# Patient Record
Sex: FEMALE | Race: WHITE | Employment: UNEMPLOYED | URBAN - METROPOLITAN AREA
[De-identification: names, ages, dates, MRNs, and addresses within clinical notes are randomized per-mention and may not be internally consistent; named-entity substitution may affect disease eponyms.]

---

## 2017-05-12 ENCOUNTER — OFFICE VISIT (OUTPATIENT)
Dept: GASTROENTEROLOGY | Age: 57
End: 2017-05-12
Payer: MEDICARE

## 2017-05-12 VITALS
TEMPERATURE: 98.1 F | WEIGHT: 131.4 LBS | RESPIRATION RATE: 14 BRPM | OXYGEN SATURATION: 98 % | DIASTOLIC BLOOD PRESSURE: 79 MMHG | HEIGHT: 58 IN | SYSTOLIC BLOOD PRESSURE: 115 MMHG | HEART RATE: 62 BPM | BODY MASS INDEX: 27.58 KG/M2

## 2017-05-12 DIAGNOSIS — R74.8 ELEVATED LIVER ENZYMES: ICD-10-CM

## 2017-05-12 DIAGNOSIS — D13.1 BENIGN FUNDIC GLAND POLYPS OF STOMACH: Primary | ICD-10-CM

## 2017-05-12 DIAGNOSIS — K21.9 GASTROESOPHAGEAL REFLUX DISEASE WITHOUT ESOPHAGITIS: ICD-10-CM

## 2017-05-12 PROCEDURE — 99214 OFFICE O/P EST MOD 30 MIN: CPT | Performed by: INTERNAL MEDICINE

## 2017-05-12 ASSESSMENT — ENCOUNTER SYMPTOMS
NAUSEA: 0
RECTAL PAIN: 0
DIARRHEA: 0
BLOOD IN STOOL: 0
RESPIRATORY NEGATIVE: 1
CONSTIPATION: 0
ABDOMINAL PAIN: 0
VOMITING: 0
ABDOMINAL DISTENTION: 0
ANAL BLEEDING: 0
EYES NEGATIVE: 1
ALLERGIC/IMMUNOLOGIC NEGATIVE: 1

## 2017-09-21 ENCOUNTER — HOSPITAL ENCOUNTER (OUTPATIENT)
Dept: MAMMOGRAPHY | Age: 57
Discharge: HOME | End: 2017-09-21
Payer: MEDICARE

## 2017-09-21 DIAGNOSIS — Z12.39 SCREENING BREAST EXAMINATION: ICD-10-CM

## 2017-09-21 PROCEDURE — G0202 SCR MAMMO BI INCL CAD: HCPCS

## 2018-10-03 ENCOUNTER — HOSPITAL ENCOUNTER (OUTPATIENT)
Dept: MAMMOGRAPHY | Age: 58
Discharge: HOME | End: 2018-10-05
Payer: MEDICARE

## 2018-10-03 DIAGNOSIS — Z12.39 SCREENING BREAST EXAMINATION: ICD-10-CM

## 2018-10-03 PROCEDURE — 77067 SCR MAMMO BI INCL CAD: CPT

## 2019-02-21 ENCOUNTER — APPOINTMENT (OUTPATIENT)
Dept: CT IMAGING | Age: 59
End: 2019-02-21
Payer: MEDICARE

## 2019-02-21 ENCOUNTER — APPOINTMENT (OUTPATIENT)
Dept: GENERAL RADIOLOGY | Age: 59
End: 2019-02-21
Payer: MEDICARE

## 2019-02-21 ENCOUNTER — HOSPITAL ENCOUNTER (EMERGENCY)
Age: 59
Discharge: HOME | End: 2019-02-21
Attending: EMERGENCY MEDICINE
Payer: MEDICARE

## 2019-02-21 VITALS
OXYGEN SATURATION: 96 % | RESPIRATION RATE: 18 BRPM | WEIGHT: 130 LBS | TEMPERATURE: 98.3 F | HEART RATE: 54 BPM | DIASTOLIC BLOOD PRESSURE: 63 MMHG | SYSTOLIC BLOOD PRESSURE: 115 MMHG | BODY MASS INDEX: 27.17 KG/M2
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
## 2019-02-21 DIAGNOSIS — S05.11XA CONTUSION OF RIGHT EYE, INITIAL ENCOUNTER: ICD-10-CM

## 2019-02-21 PROCEDURE — 73564 X-RAY EXAM KNEE 4 OR MORE: CPT

## 2019-02-21 PROCEDURE — 70450 CT HEAD/BRAIN W/O DYE: CPT

## 2019-02-21 PROCEDURE — 99284 EMERGENCY DEPT VISIT MOD MDM: CPT

## 2019-02-21 PROCEDURE — 70486 CT MAXILLOFACIAL W/O DYE: CPT

## 2019-02-21 RX ORDER — HYDROCODONE BITARTRATE AND ACETAMINOPHEN 5; 325 MG/1; MG/1
1 TABLET ORAL EVERY 8 HOURS PRN
Qty: 9 TABLET | Refills: 0 | Status: SHIPPED | OUTPATIENT
Start: 2019-02-21 | End: 2019-02-21

## 2019-02-21 RX ORDER — HYDROCODONE BITARTRATE AND ACETAMINOPHEN 5; 325 MG/1; MG/1
1 TABLET ORAL EVERY 8 HOURS PRN
Qty: 9 TABLET | Refills: 0 | Status: SHIPPED | OUTPATIENT
Start: 2019-02-21 | End: 2019-02-24

## 2019-02-21 ASSESSMENT — PAIN DESCRIPTION - PAIN TYPE: TYPE: ACUTE PAIN

## 2019-02-21 ASSESSMENT — PAIN DESCRIPTION - LOCATION: LOCATION: HEAD

## 2019-02-21 ASSESSMENT — PAIN SCALES - GENERAL: PAINLEVEL_OUTOF10: 7

## 2019-06-14 ENCOUNTER — HOSPITAL ENCOUNTER (OUTPATIENT)
Dept: CT IMAGING | Age: 59
Discharge: HOME | End: 2019-06-16
Payer: MEDICARE

## 2019-06-14 DIAGNOSIS — R10.32 ABDOMINAL PAIN, LEFT LOWER QUADRANT: ICD-10-CM

## 2019-06-14 LAB
CREAT SERPL-MCNC: 0.88 MG/DL (ref 0.5–0.9)
GFR AFRICAN AMERICAN: >60 ML/MIN
GFR NON-AFRICAN AMERICAN: >60 ML/MIN
GFR SERPL CREATININE-BSD FRML MDRD: NORMAL ML/MIN/{1.73_M2}
GFR SERPL CREATININE-BSD FRML MDRD: NORMAL ML/MIN/{1.73_M2}

## 2019-06-14 PROCEDURE — 74177 CT ABD & PELVIS W/CONTRAST: CPT

## 2019-06-14 PROCEDURE — 82565 ASSAY OF CREATININE: CPT

## 2019-06-14 PROCEDURE — 6360000004 HC RX CONTRAST MEDICATION: Performed by: FAMILY MEDICINE

## 2019-06-14 PROCEDURE — 36415 COLL VENOUS BLD VENIPUNCTURE: CPT

## 2019-06-14 PROCEDURE — 2580000003 HC RX 258: Performed by: FAMILY MEDICINE

## 2019-06-14 RX ORDER — SODIUM CHLORIDE 0.9 % (FLUSH) 0.9 %
10 SYRINGE (ML) INJECTION PRN
Status: DISCONTINUED | OUTPATIENT
Start: 2019-06-14 | End: 2019-06-17 | Stop reason: HOSPADM

## 2019-06-14 RX ORDER — 0.9 % SODIUM CHLORIDE 0.9 %
80 INTRAVENOUS SOLUTION INTRAVENOUS ONCE
Status: COMPLETED | OUTPATIENT
Start: 2019-06-14 | End: 2019-06-14

## 2019-06-14 RX ADMIN — IOHEXOL 30 ML: 300 INJECTION, SOLUTION INTRAVENOUS at 10:13

## 2019-06-14 RX ADMIN — SODIUM CHLORIDE 80 ML: 0.9 INJECTION, SOLUTION INTRAVENOUS at 10:13

## 2019-06-14 RX ADMIN — SODIUM CHLORIDE, PRESERVATIVE FREE 10 ML: 5 INJECTION INTRAVENOUS at 10:14

## 2019-06-14 RX ADMIN — IOPAMIDOL 80 ML: 755 INJECTION, SOLUTION INTRAVENOUS at 10:13

## 2019-06-28 ENCOUNTER — HOSPITAL ENCOUNTER (OUTPATIENT)
Dept: ULTRASOUND IMAGING | Age: 59
Discharge: HOME | End: 2019-06-30
Payer: MEDICARE

## 2019-06-28 ENCOUNTER — HOSPITAL ENCOUNTER (OUTPATIENT)
Dept: CT IMAGING | Age: 59
Discharge: HOME | End: 2019-06-30
Payer: MEDICARE

## 2019-06-28 DIAGNOSIS — R93.5 ABNORMAL CT SCAN, PELVIS: ICD-10-CM

## 2019-06-28 DIAGNOSIS — R91.8 OTHER NONSPECIFIC ABNORMAL FINDING OF LUNG FIELD: ICD-10-CM

## 2019-06-28 PROCEDURE — 76770 US EXAM ABDO BACK WALL COMP: CPT

## 2019-06-28 PROCEDURE — 71250 CT THORAX DX C-: CPT

## 2019-07-03 ENCOUNTER — APPOINTMENT (OUTPATIENT)
Dept: CT IMAGING | Age: 59
End: 2019-07-03
Payer: MEDICARE

## 2019-07-03 ENCOUNTER — HOSPITAL ENCOUNTER (EMERGENCY)
Age: 59
Discharge: HOME | End: 2019-07-03
Attending: EMERGENCY MEDICINE
Payer: MEDICARE

## 2019-07-03 VITALS
SYSTOLIC BLOOD PRESSURE: 112 MMHG | OXYGEN SATURATION: 98 % | HEART RATE: 59 BPM | HEIGHT: 58 IN | BODY MASS INDEX: 28.55 KG/M2 | WEIGHT: 136 LBS | RESPIRATION RATE: 16 BRPM | DIASTOLIC BLOOD PRESSURE: 68 MMHG | TEMPERATURE: 97.8 F

## 2019-07-03 DIAGNOSIS — R10.32 LEFT LOWER QUADRANT PAIN: Primary | ICD-10-CM

## 2019-07-03 LAB
-: ABNORMAL
ABSOLUTE EOS #: 0.08 K/UL (ref 0–0.44)
ABSOLUTE IMMATURE GRANULOCYTE: 0.03 K/UL (ref 0–0.3)
ABSOLUTE LYMPH #: 1.95 K/UL (ref 1.1–3.7)
ABSOLUTE MONO #: 0.47 K/UL (ref 0.1–1.2)
ALBUMIN SERPL-MCNC: 3.7 G/DL (ref 3.5–5.2)
ALBUMIN/GLOBULIN RATIO: NORMAL (ref 1–2.5)
ALP BLD-CCNC: 55 U/L (ref 35–104)
ALT SERPL-CCNC: 13 U/L (ref 5–33)
AMORPHOUS: ABNORMAL
AMYLASE: 72 U/L (ref 28–100)
ANION GAP SERPL CALCULATED.3IONS-SCNC: 10 MMOL/L (ref 9–17)
AST SERPL-CCNC: 21 U/L
BACTERIA: ABNORMAL
BASOPHILS # BLD: 1 % (ref 0–2)
BASOPHILS ABSOLUTE: 0.05 K/UL (ref 0–0.2)
BILIRUB SERPL-MCNC: 0.54 MG/DL (ref 0.3–1.2)
BILIRUBIN DIRECT: 0.1 MG/DL
BILIRUBIN URINE: NEGATIVE
BILIRUBIN, INDIRECT: 0.44 MG/DL (ref 0–1)
BUN BLDV-MCNC: 11 MG/DL (ref 8–23)
BUN/CREAT BLD: 12 (ref 9–20)
CALCIUM SERPL-MCNC: 9 MG/DL (ref 8.6–10.4)
CASTS UA: ABNORMAL /LPF
CHLORIDE BLD-SCNC: 106 MMOL/L (ref 98–107)
CO2: 23 MMOL/L (ref 20–31)
COLOR: YELLOW
COMMENT UA: ABNORMAL
CREAT SERPL-MCNC: 0.9 MG/DL (ref 0.5–0.9)
CRYSTALS, UA: ABNORMAL /HPF
DIFFERENTIAL TYPE: ABNORMAL
EOSINOPHILS RELATIVE PERCENT: 2 % (ref 1–4)
EPITHELIAL CELLS UA: ABNORMAL /HPF (ref 0–5)
GFR AFRICAN AMERICAN: >60 ML/MIN
GFR NON-AFRICAN AMERICAN: >60 ML/MIN
GFR SERPL CREATININE-BSD FRML MDRD: NORMAL ML/MIN/{1.73_M2}
GFR SERPL CREATININE-BSD FRML MDRD: NORMAL ML/MIN/{1.73_M2}
GLOBULIN: NORMAL G/DL (ref 1.5–3.8)
GLUCOSE BLD-MCNC: 88 MG/DL (ref 70–99)
GLUCOSE URINE: NEGATIVE
HCT VFR BLD CALC: 39.3 % (ref 36.3–47.1)
HEMOGLOBIN: 12.4 G/DL (ref 11.9–15.1)
IMMATURE GRANULOCYTES: 1 %
KETONES, URINE: NEGATIVE
LEUKOCYTE ESTERASE, URINE: ABNORMAL
LIPASE: 50 U/L (ref 13–60)
LYMPHOCYTES # BLD: 36 % (ref 24–43)
MCH RBC QN AUTO: 27.3 PG (ref 25.2–33.5)
MCHC RBC AUTO-ENTMCNC: 31.6 G/DL (ref 28.4–34.8)
MCV RBC AUTO: 86.6 FL (ref 82.6–102.9)
MONOCYTES # BLD: 9 % (ref 3–12)
MUCUS: ABNORMAL
NITRITE, URINE: NEGATIVE
NRBC AUTOMATED: 0 PER 100 WBC
OTHER OBSERVATIONS UA: ABNORMAL
PDW BLD-RTO: 13 % (ref 11.8–14.4)
PH UA: 6 (ref 5–8)
PLATELET # BLD: 153 K/UL (ref 138–453)
PLATELET ESTIMATE: ABNORMAL
PMV BLD AUTO: 11.1 FL (ref 8.1–13.5)
POTASSIUM SERPL-SCNC: 4.1 MMOL/L (ref 3.7–5.3)
PROTEIN UA: NEGATIVE
RBC # BLD: 4.54 M/UL (ref 3.95–5.11)
RBC # BLD: ABNORMAL 10*6/UL
RBC UA: ABNORMAL /HPF (ref 0–2)
RENAL EPITHELIAL, UA: ABNORMAL /HPF
SEG NEUTROPHILS: 51 % (ref 36–65)
SEGMENTED NEUTROPHILS ABSOLUTE COUNT: 2.91 K/UL (ref 1.5–8.1)
SODIUM BLD-SCNC: 139 MMOL/L (ref 135–144)
SPECIFIC GRAVITY UA: 1 (ref 1–1.03)
TOTAL PROTEIN: 6.5 G/DL (ref 6.4–8.3)
TRICHOMONAS: ABNORMAL
TURBIDITY: ABNORMAL
URINE HGB: NEGATIVE
UROBILINOGEN, URINE: NORMAL
WBC # BLD: 5.5 K/UL (ref 3.5–11.3)
WBC # BLD: ABNORMAL 10*3/UL
WBC UA: ABNORMAL /HPF (ref 0–5)
YEAST: ABNORMAL

## 2019-07-03 PROCEDURE — 2580000003 HC RX 258: Performed by: EMERGENCY MEDICINE

## 2019-07-03 PROCEDURE — 83690 ASSAY OF LIPASE: CPT

## 2019-07-03 PROCEDURE — 80048 BASIC METABOLIC PNL TOTAL CA: CPT

## 2019-07-03 PROCEDURE — 74177 CT ABD & PELVIS W/CONTRAST: CPT

## 2019-07-03 PROCEDURE — 80076 HEPATIC FUNCTION PANEL: CPT

## 2019-07-03 PROCEDURE — 87086 URINE CULTURE/COLONY COUNT: CPT

## 2019-07-03 PROCEDURE — 99284 EMERGENCY DEPT VISIT MOD MDM: CPT

## 2019-07-03 PROCEDURE — 85025 COMPLETE CBC W/AUTO DIFF WBC: CPT

## 2019-07-03 PROCEDURE — 82150 ASSAY OF AMYLASE: CPT

## 2019-07-03 PROCEDURE — 6360000004 HC RX CONTRAST MEDICATION: Performed by: EMERGENCY MEDICINE

## 2019-07-03 PROCEDURE — 81001 URINALYSIS AUTO W/SCOPE: CPT

## 2019-07-03 RX ORDER — SODIUM CHLORIDE 0.9 % (FLUSH) 0.9 %
10 SYRINGE (ML) INJECTION PRN
Status: DISCONTINUED | OUTPATIENT
Start: 2019-07-03 | End: 2019-07-03 | Stop reason: HOSPADM

## 2019-07-03 RX ORDER — 0.9 % SODIUM CHLORIDE 0.9 %
80 INTRAVENOUS SOLUTION INTRAVENOUS ONCE
Status: COMPLETED | OUTPATIENT
Start: 2019-07-03 | End: 2019-07-03

## 2019-07-03 RX ADMIN — IOPAMIDOL 75 ML: 755 INJECTION, SOLUTION INTRAVENOUS at 17:32

## 2019-07-03 RX ADMIN — SODIUM CHLORIDE 80 ML: 9 INJECTION, SOLUTION INTRAVENOUS at 17:32

## 2019-07-03 RX ADMIN — Medication 10 ML: at 17:32

## 2019-07-03 ASSESSMENT — PAIN SCALES - GENERAL: PAINLEVEL_OUTOF10: 6

## 2019-07-03 ASSESSMENT — PAIN DESCRIPTION - LOCATION: LOCATION: ABDOMEN

## 2019-07-03 ASSESSMENT — PAIN DESCRIPTION - DESCRIPTORS: DESCRIPTORS: ACHING

## 2019-07-03 ASSESSMENT — PAIN DESCRIPTION - FREQUENCY: FREQUENCY: INTERMITTENT

## 2019-07-03 ASSESSMENT — PAIN SCALES - WONG BAKER: WONGBAKER_NUMERICALRESPONSE: 6

## 2019-07-04 LAB
CULTURE: NORMAL
Lab: NORMAL
SPECIMEN DESCRIPTION: NORMAL

## 2019-07-04 ASSESSMENT — ENCOUNTER SYMPTOMS
DIARRHEA: 0
NAUSEA: 0
CONSTIPATION: 0
COLOR CHANGE: 0
SINUS PRESSURE: 0
SHORTNESS OF BREATH: 0
RHINORRHEA: 0
VOMITING: 0
SORE THROAT: 0
COUGH: 0
WHEEZING: 0
ABDOMINAL PAIN: 1

## 2019-07-04 NOTE — ED PROVIDER NOTES
Urinary bladder is grossly unremarkable. Uterus appears to have been removed. No adnexal mass. Peritoneum/Retroperitoneum: No free air. No free fluid. No lymphadenopathy. Bones/Soft Tissues: Abdominal wall demonstrates no acute findings. Osseous structures demonstrates mild degenerative changes. No aggressive bony lesions. *No acute abdominal process. *Sigmoid diverticulosis. *Fat seen within the wall of the cecum suggestive of prior colitis. *Atelectasis/ground-glass opacity/scarring involving the basilar segments of the lower lobes. No focal lung consolidation is seen to suggest pneumonia. If complete evaluation is needed, CT chest is recommended. Us Retroperitoneal Complete    Result Date: 6/28/2019  EXAMINATION: RETROPERITONEAL ULTRASOUND OF THE KIDNEYS AND URINARY BLADDER 6/28/2019 COMPARISON: CT of the abdomen and pelvis from 06/14/2019 HISTORY: ORDERING SYSTEM PROVIDED HISTORY: Abnormal CT scan, pelvis Left lower quadrant pain for 3 months FINDINGS: Study is somewhat limited technically due to patient body habitus. Kidneys: The right kidney measures 8.2 x 3.7 x 4.4 cm and the left kidney measures 9.7 x 5.0 x 4.7 cm. Cortical thickness is 8.2 mm on the right and 12.6 mm on the left. Kidneys demonstrate normal cortical echogenicity. No evidence of hydronephrosis or intrarenal stones. Bladder: Unremarkable appearance of the bladder. Pre voiding volume 183.5 mL. Bilateral ureteral jets demonstrated. No post void residual was obtained. Unremarkable ultrasound of the kidneys and urinary bladder. Interpretation per the Radiologist below, if available at the time of this note:    CT ABDOMEN PELVIS W IV CONTRAST   Final Result   No acute finding in the abdomen or pelvis. Specifically, the appendix is   normal.      Dependent right lower lobe airspace disease may represent atelectasis and or   pneumonia.                  LABS:  Labs Reviewed   CBC WITH AUTO DIFFERENTIAL - Abnormal; Notable for the following components:       Result Value    Immature Granulocytes 1 (*)     All other components within normal limits   URINE RT REFLEX TO CULTURE - Abnormal; Notable for the following components:    Turbidity UA SLIGHTLY CLOUDY (*)     Specific Gravity, UA 1.002 (*)     Leukocyte Esterase, Urine SMALL (*)     All other components within normal limits   MICROSCOPIC URINALYSIS - Abnormal; Notable for the following components:    Crystals UA 0 TO 2 URIC ACID (*)     Bacteria, UA FEW (*)     All other components within normal limits   URINE CULTURE CLEAN CATCH   BASIC METABOLIC PANEL   LIPASE   AMYLASE   HEPATIC FUNCTION PANEL       All other labs were within normal range or not returned as of this dictation. EMERGENCY DEPARTMENT COURSE and DIFFERENTIAL DIAGNOSIS/MDM:   Vitals:    Vitals:    07/03/19 1607   BP: 112/68   Pulse: 59   Resp: 16   Temp: 97.8 °F (36.6 °C)   TempSrc: Oral   SpO2: 98%   Weight: 136 lb (61.7 kg)   Height: 4' 10\" (1.473 m)       Medical Decision Making: imaging and labs are unremarkable. Patient is able to be discharged home. Follow-up with her doctor. Medications   0.9 % sodium chloride bolus (0 mLs Intravenous Stopped 7/3/19 1733)   iopamidol (ISOVUE-370) 76 % injection 75 mL (75 mLs Intravenous Given 7/3/19 1732)       FINAL IMPRESSION      1.  Left lower quadrant pain          DISPOSITION/PLAN   DISPOSITION Decision To Discharge 07/03/2019 06:05:10 PM      PATIENT REFERRED TO:   Bozena Sullivan DO  [facility]  [address] [address]  [phone]    Call in 2 days        DISCHARGE MEDICATIONS:     Discharge Medication List as of 7/3/2019  6:06 PM              (Please note that portions of this note were completed with a voice recognition program.  Efforts were made to edit the dictations but occasionally words are mis-transcribed.)    Severino Brunner NP, APRN - CNP  Certified Nurse Practitioner          Verna Kocher, APRN - CNP  07/04/19 9666

## 2019-09-13 ENCOUNTER — HOSPITAL ENCOUNTER (EMERGENCY)
Age: 59
Discharge: HOME | End: 2019-09-13
Attending: EMERGENCY MEDICINE
Payer: MEDICARE

## 2019-09-13 VITALS
HEART RATE: 70 BPM | DIASTOLIC BLOOD PRESSURE: 63 MMHG | WEIGHT: 136 LBS | OXYGEN SATURATION: 100 % | HEIGHT: 58 IN | RESPIRATION RATE: 18 BRPM | BODY MASS INDEX: 28.55 KG/M2 | TEMPERATURE: 97.7 F | SYSTOLIC BLOOD PRESSURE: 112 MMHG

## 2019-09-13 DIAGNOSIS — B02.9 HERPES ZOSTER WITHOUT COMPLICATION: Primary | ICD-10-CM

## 2019-09-13 PROCEDURE — 6370000000 HC RX 637 (ALT 250 FOR IP): Performed by: NURSE PRACTITIONER

## 2019-09-13 PROCEDURE — 99282 EMERGENCY DEPT VISIT SF MDM: CPT

## 2019-09-13 PROCEDURE — 6360000002 HC RX W HCPCS: Performed by: NURSE PRACTITIONER

## 2019-09-13 PROCEDURE — 96372 THER/PROPH/DIAG INJ SC/IM: CPT

## 2019-09-13 RX ORDER — ACYCLOVIR 800 MG/1
800 TABLET ORAL
Qty: 50 TABLET | Refills: 0 | Status: SHIPPED | OUTPATIENT
Start: 2019-09-13 | End: 2019-09-23

## 2019-09-13 RX ORDER — HYDROCODONE BITARTRATE AND ACETAMINOPHEN 5; 325 MG/1; MG/1
1 TABLET ORAL ONCE
Status: COMPLETED | OUTPATIENT
Start: 2019-09-13 | End: 2019-09-13

## 2019-09-13 RX ORDER — HYDROCODONE BITARTRATE AND ACETAMINOPHEN 5; 325 MG/1; MG/1
1 TABLET ORAL EVERY 8 HOURS PRN
Qty: 20 TABLET | Refills: 0 | Status: SHIPPED | OUTPATIENT
Start: 2019-09-13 | End: 2019-09-20

## 2019-09-13 RX ORDER — KETOROLAC TROMETHAMINE 30 MG/ML
30 INJECTION, SOLUTION INTRAMUSCULAR; INTRAVENOUS ONCE
Status: COMPLETED | OUTPATIENT
Start: 2019-09-13 | End: 2019-09-13

## 2019-09-13 RX ADMIN — KETOROLAC TROMETHAMINE 30 MG: 30 INJECTION, SOLUTION INTRAMUSCULAR at 20:47

## 2019-09-13 RX ADMIN — HYDROCODONE BITARTRATE AND ACETAMINOPHEN 1 TABLET: 5; 325 TABLET ORAL at 20:55

## 2019-09-13 ASSESSMENT — PAIN DESCRIPTION - DESCRIPTORS: DESCRIPTORS: CONSTANT;DISCOMFORT;THROBBING

## 2019-09-13 ASSESSMENT — PAIN DESCRIPTION - ORIENTATION: ORIENTATION: LEFT

## 2019-09-13 ASSESSMENT — PAIN SCALES - GENERAL
PAINLEVEL_OUTOF10: 10

## 2019-09-13 ASSESSMENT — ENCOUNTER SYMPTOMS
BACK PAIN: 0
SHORTNESS OF BREATH: 0

## 2019-09-13 ASSESSMENT — PAIN DESCRIPTION - FREQUENCY: FREQUENCY: CONTINUOUS

## 2019-09-13 ASSESSMENT — PAIN DESCRIPTION - PAIN TYPE: TYPE: ACUTE PAIN

## 2019-09-13 ASSESSMENT — PAIN DESCRIPTION - LOCATION: LOCATION: SHOULDER;NECK

## 2019-09-14 NOTE — ED PROVIDER NOTES
TRIGGER RELEASE      thumb    HYSTERECTOMY      KNEE SURGERY      right     UPPER GASTROINTESTINAL ENDOSCOPY  10/08/2012    [facility]; GERD    WRIST SURGERY      left          FAMILY HISTORY           Problem Relation Age of Onset    Cancer Mother     Cancer Father     Cancer Maternal Grandmother     Cancer Paternal Grandmother      Family Status   Relation Name Status    Mother  Alive    Father  Alive    MGM  (Not Specified)    PGM  (Not Specified)        SOCIAL HISTORY      reports that she has quit smoking. She has never used smokeless tobacco. She reports that she does not drink alcohol or use drugs. REVIEW OF SYSTEMS    (2-9 systems for level 4, 10 or more for level 5)     Review of Systems   Constitutional: Negative for chills, diaphoresis, fatigue and fever. Respiratory: Negative for shortness of breath. Cardiovascular: Negative for chest pain. Musculoskeletal: Positive for arthralgias, myalgias and neck pain. Negative for back pain. Skin: Positive for rash. Negative for wound. Neurological: Negative for dizziness, weakness, numbness and headaches. Except as noted above the remainder of the review of systems was reviewed and negative. PHYSICAL EXAM    (up to 7 for level 4, 8 or more for level 5)     ED Triage Vitals   BP Temp Temp Source Pulse Resp SpO2 Height Weight   09/13/19 1948 09/13/19 1948 09/13/19 1948 09/13/19 1948 09/13/19 1948 09/13/19 1948 09/13/19 1949 09/13/19 1949   112/63 97.7 °F (36.5 °C) Oral 70 18 100 % 4' 10\" (1.473 m) 136 lb (61.7 kg)     Physical Exam   Constitutional: She is oriented to person, place, and time. She appears well-developed and well-nourished. No distress. Eyes: Conjunctivae are normal.   Cardiovascular: Intact distal pulses. Pulmonary/Chest: Effort normal. No respiratory distress. Musculoskeletal:        Left shoulder: She exhibits tenderness and pain. She exhibits normal range of motion, no bony tenderness and no deformity. Cervical back: She exhibits tenderness (left supraspinal) and pain. She exhibits no bony tenderness, no swelling and no deformity. Neurological: She is alert and oriented to person, place, and time. Skin: Skin is warm and dry. Capillary refill takes less than 2 seconds. Rash (in clusters to left neck, shoulder, chest. erythematous.) noted. Rash is maculopapular. She is not diaphoretic. Psychiatric: She has a normal mood and affect. Her behavior is normal.   Vitals reviewed. EMERGENCY DEPARTMENT COURSE and DIFFERENTIAL DIAGNOSIS/MDM:   Vitals:    Vitals:    09/13/19 1948 09/13/19 1949   BP: 112/63    Pulse: 70    Resp: 18    Temp: 97.7 °F (36.5 °C)    TempSrc: Oral    SpO2: 100%    Weight:  136 lb (61.7 kg)   Height:  4' 10\" (1.473 m)         MEDICATIONS GIVEN IN THE ED:  Medications   ketorolac (TORADOL) injection 30 mg (has no administration in time range)       CLINICAL DECISION MAKING:  The patient presented alert with a nontoxic appearance and was seen in conjunction with Dr. Kei Araiza. OARRS was reviewed. Prescriptions were written for norco and acyclovir. The patient was advised to not drink alcohol, drive, or operate heavy machinery while taking the norco. Follow up with pcp, return to ED if condition worsens. FINAL IMPRESSION      1.  Herpes zoster without complication            Problem List  Patient Active Problem List   Diagnosis Code    Elevated liver enzymes R74.8    GERD (gastroesophageal reflux disease) K21.9    Benign fundic gland polyps of stomach D13.1         DISPOSITION/PLAN   DISPOSITION Decision To Discharge 09/13/2019 08:14:29 PM      PATIENT REFERRED TO:   Bozena Sullivan DO  [address] [place] [address] [address]  [phone]    Schedule an appointment as soon as possible for a visit       [facility] ED  [address] [facility]  [phone]    If symptoms worsen, As needed      DISCHARGE MEDICATIONS:     New Prescriptions    ACYCLOVIR (ZOVIRAX) 800 MG

## 2021-05-25 ENCOUNTER — APPOINTMENT (OUTPATIENT)
Dept: CT IMAGING | Age: 61
DRG: 463 | End: 2021-05-25
Payer: MEDICARE

## 2021-05-25 ENCOUNTER — APPOINTMENT (OUTPATIENT)
Dept: GENERAL RADIOLOGY | Age: 61
DRG: 463 | End: 2021-05-25
Payer: MEDICARE

## 2021-05-25 ENCOUNTER — HOSPITAL ENCOUNTER (INPATIENT)
Age: 61
LOS: 3 days | Discharge: HOME | DRG: 463 | End: 2021-05-28
Attending: EMERGENCY MEDICINE | Admitting: FAMILY MEDICINE
Payer: MEDICARE

## 2021-05-25 DIAGNOSIS — R42 DIZZINESS: Primary | ICD-10-CM

## 2021-05-25 DIAGNOSIS — R94.31 T WAVE INVERSION IN EKG: ICD-10-CM

## 2021-05-25 DIAGNOSIS — N30.00 ACUTE CYSTITIS WITHOUT HEMATURIA: ICD-10-CM

## 2021-05-25 PROBLEM — E86.0 DEHYDRATION: Status: ACTIVE | Noted: 2021-05-25

## 2021-05-25 LAB
-: NORMAL
ABSOLUTE EOS #: 0.04 K/UL (ref 0–0.44)
ABSOLUTE IMMATURE GRANULOCYTE: 0.03 K/UL (ref 0–0.3)
ABSOLUTE LYMPH #: 1.61 K/UL (ref 1.1–3.7)
ABSOLUTE MONO #: 0.25 K/UL (ref 0.1–1.2)
ALBUMIN SERPL-MCNC: 4 G/DL (ref 3.5–5.2)
ALBUMIN/GLOBULIN RATIO: NORMAL (ref 1–2.5)
ALP BLD-CCNC: 66 U/L (ref 35–104)
ALT SERPL-CCNC: 14 U/L (ref 5–33)
AMORPHOUS: NORMAL
ANION GAP SERPL CALCULATED.3IONS-SCNC: 10 MMOL/L (ref 9–17)
AST SERPL-CCNC: 23 U/L
BACTERIA: NORMAL
BASOPHILS # BLD: 1 % (ref 0–2)
BASOPHILS ABSOLUTE: 0.05 K/UL (ref 0–0.2)
BILIRUB SERPL-MCNC: 0.5 MG/DL (ref 0.3–1.2)
BILIRUBIN URINE: NEGATIVE
BUN BLDV-MCNC: 10 MG/DL (ref 8–23)
BUN/CREAT BLD: 12 (ref 9–20)
CALCIUM SERPL-MCNC: 9.6 MG/DL (ref 8.6–10.4)
CASTS UA: NORMAL /LPF
CHLORIDE BLD-SCNC: 106 MMOL/L (ref 98–107)
CO2: 23 MMOL/L (ref 20–31)
COLOR: YELLOW
COMMENT UA: ABNORMAL
CREAT SERPL-MCNC: 0.81 MG/DL (ref 0.5–0.9)
CRYSTALS, UA: NORMAL /HPF
D-DIMER QUANTITATIVE: 0.42 MG/L FEU (ref 0–0.59)
DIFFERENTIAL TYPE: ABNORMAL
EKG ATRIAL RATE: 67 BPM
EKG P AXIS: 9 DEGREES
EKG P-R INTERVAL: 160 MS
EKG Q-T INTERVAL: 398 MS
EKG QRS DURATION: 78 MS
EKG QTC CALCULATION (BAZETT): 420 MS
EKG R AXIS: -25 DEGREES
EKG T AXIS: -3 DEGREES
EKG VENTRICULAR RATE: 67 BPM
EOSINOPHILS RELATIVE PERCENT: 1 % (ref 1–4)
EPITHELIAL CELLS UA: NORMAL /HPF (ref 0–5)
GFR AFRICAN AMERICAN: >60 ML/MIN
GFR NON-AFRICAN AMERICAN: >60 ML/MIN
GFR SERPL CREATININE-BSD FRML MDRD: NORMAL ML/MIN/{1.73_M2}
GFR SERPL CREATININE-BSD FRML MDRD: NORMAL ML/MIN/{1.73_M2}
GLUCOSE BLD-MCNC: 90 MG/DL (ref 70–99)
GLUCOSE URINE: NEGATIVE
HCT VFR BLD CALC: 43.2 % (ref 36.3–47.1)
HEMOGLOBIN: 13.5 G/DL (ref 11.9–15.1)
IMMATURE GRANULOCYTES: 1 %
KETONES, URINE: NEGATIVE
LACTIC ACID: 1.1 MMOL/L (ref 0.5–2.2)
LEUKOCYTE ESTERASE, URINE: ABNORMAL
LYMPHOCYTES # BLD: 28 % (ref 24–43)
MCH RBC QN AUTO: 27.5 PG (ref 25.2–33.5)
MCHC RBC AUTO-ENTMCNC: 31.3 G/DL (ref 28.4–34.8)
MCV RBC AUTO: 88 FL (ref 82.6–102.9)
MONOCYTES # BLD: 4 % (ref 3–12)
MUCUS: NORMAL
MYOGLOBIN: 27 NG/ML (ref 25–58)
NITRITE, URINE: NEGATIVE
NRBC AUTOMATED: 0 PER 100 WBC
OTHER OBSERVATIONS UA: NORMAL
PDW BLD-RTO: 12.8 % (ref 11.8–14.4)
PH UA: 5.5 (ref 5–8)
PLATELET # BLD: 182 K/UL (ref 138–453)
PLATELET ESTIMATE: ABNORMAL
PMV BLD AUTO: 10.9 FL (ref 8.1–13.5)
POTASSIUM SERPL-SCNC: 4.2 MMOL/L (ref 3.7–5.3)
PROTEIN UA: NEGATIVE
RBC # BLD: 4.91 M/UL (ref 3.95–5.11)
RBC # BLD: ABNORMAL 10*6/UL
RBC UA: NORMAL /HPF (ref 0–2)
RENAL EPITHELIAL, UA: NORMAL /HPF
SEG NEUTROPHILS: 65 % (ref 36–65)
SEGMENTED NEUTROPHILS ABSOLUTE COUNT: 3.83 K/UL (ref 1.5–8.1)
SODIUM BLD-SCNC: 139 MMOL/L (ref 135–144)
SPECIFIC GRAVITY UA: 1.02 (ref 1–1.03)
TOTAL CK: 47 U/L (ref 26–192)
TOTAL PROTEIN: 7.1 G/DL (ref 6.4–8.3)
TRICHOMONAS: NORMAL
TROPONIN INTERP: NORMAL
TROPONIN T: NORMAL NG/ML
TROPONIN, HIGH SENSITIVITY: <6 NG/L (ref 0–14)
TURBIDITY: ABNORMAL
URINE HGB: ABNORMAL
UROBILINOGEN, URINE: NORMAL
WBC # BLD: 5.8 K/UL (ref 3.5–11.3)
WBC # BLD: ABNORMAL 10*3/UL
WBC UA: NORMAL /HPF (ref 0–5)
YEAST: NORMAL

## 2021-05-25 PROCEDURE — 6370000000 HC RX 637 (ALT 250 FOR IP): Performed by: EMERGENCY MEDICINE

## 2021-05-25 PROCEDURE — 82550 ASSAY OF CK (CPK): CPT

## 2021-05-25 PROCEDURE — 87086 URINE CULTURE/COLONY COUNT: CPT

## 2021-05-25 PROCEDURE — 70450 CT HEAD/BRAIN W/O DYE: CPT

## 2021-05-25 PROCEDURE — 83874 ASSAY OF MYOGLOBIN: CPT

## 2021-05-25 PROCEDURE — 85379 FIBRIN DEGRADATION QUANT: CPT

## 2021-05-25 PROCEDURE — 71045 X-RAY EXAM CHEST 1 VIEW: CPT

## 2021-05-25 PROCEDURE — 85025 COMPLETE CBC W/AUTO DIFF WBC: CPT

## 2021-05-25 PROCEDURE — 81001 URINALYSIS AUTO W/SCOPE: CPT

## 2021-05-25 PROCEDURE — 99284 EMERGENCY DEPT VISIT MOD MDM: CPT

## 2021-05-25 PROCEDURE — 6370000000 HC RX 637 (ALT 250 FOR IP): Performed by: FAMILY MEDICINE

## 2021-05-25 PROCEDURE — 83605 ASSAY OF LACTIC ACID: CPT

## 2021-05-25 PROCEDURE — 93005 ELECTROCARDIOGRAM TRACING: CPT | Performed by: EMERGENCY MEDICINE

## 2021-05-25 PROCEDURE — 80053 COMPREHEN METABOLIC PANEL: CPT

## 2021-05-25 PROCEDURE — 96374 THER/PROPH/DIAG INJ IV PUSH: CPT

## 2021-05-25 PROCEDURE — 93010 ELECTROCARDIOGRAM REPORT: CPT | Performed by: INTERNAL MEDICINE

## 2021-05-25 PROCEDURE — 84484 ASSAY OF TROPONIN QUANT: CPT

## 2021-05-25 PROCEDURE — 2060000000 HC ICU INTERMEDIATE R&B

## 2021-05-25 PROCEDURE — 2580000003 HC RX 258: Performed by: FAMILY MEDICINE

## 2021-05-25 PROCEDURE — 6360000002 HC RX W HCPCS: Performed by: EMERGENCY MEDICINE

## 2021-05-25 PROCEDURE — 2580000003 HC RX 258: Performed by: EMERGENCY MEDICINE

## 2021-05-25 RX ORDER — MECLIZINE HCL 12.5 MG/1
25 TABLET ORAL ONCE
Status: COMPLETED | OUTPATIENT
Start: 2021-05-25 | End: 2021-05-25

## 2021-05-25 RX ORDER — POLYETHYLENE GLYCOL 3350 17 G/17G
17 POWDER, FOR SOLUTION ORAL DAILY PRN
Status: DISCONTINUED | OUTPATIENT
Start: 2021-05-25 | End: 2021-05-28 | Stop reason: HOSPADM

## 2021-05-25 RX ORDER — SODIUM CHLORIDE 0.9 % (FLUSH) 0.9 %
5-40 SYRINGE (ML) INJECTION EVERY 12 HOURS SCHEDULED
Status: DISCONTINUED | OUTPATIENT
Start: 2021-05-25 | End: 2021-05-28 | Stop reason: HOSPADM

## 2021-05-25 RX ORDER — SODIUM CHLORIDE 0.9 % (FLUSH) 0.9 %
10 SYRINGE (ML) INJECTION PRN
Status: DISCONTINUED | OUTPATIENT
Start: 2021-05-25 | End: 2021-05-28 | Stop reason: HOSPADM

## 2021-05-25 RX ORDER — POTASSIUM CHLORIDE 7.45 MG/ML
10 INJECTION INTRAVENOUS PRN
Status: DISCONTINUED | OUTPATIENT
Start: 2021-05-25 | End: 2021-05-28 | Stop reason: HOSPADM

## 2021-05-25 RX ORDER — ONDANSETRON 2 MG/ML
4 INJECTION INTRAMUSCULAR; INTRAVENOUS ONCE
Status: COMPLETED | OUTPATIENT
Start: 2021-05-25 | End: 2021-05-25

## 2021-05-25 RX ORDER — SODIUM CHLORIDE 9 MG/ML
INJECTION, SOLUTION INTRAVENOUS CONTINUOUS
Status: DISCONTINUED | OUTPATIENT
Start: 2021-05-25 | End: 2021-05-27

## 2021-05-25 RX ORDER — SODIUM CHLORIDE 9 MG/ML
25 INJECTION, SOLUTION INTRAVENOUS PRN
Status: DISCONTINUED | OUTPATIENT
Start: 2021-05-25 | End: 2021-05-28 | Stop reason: HOSPADM

## 2021-05-25 RX ORDER — ACETAMINOPHEN 650 MG/1
650 SUPPOSITORY RECTAL EVERY 6 HOURS PRN
Status: DISCONTINUED | OUTPATIENT
Start: 2021-05-25 | End: 2021-05-28 | Stop reason: HOSPADM

## 2021-05-25 RX ORDER — PROMETHAZINE HYDROCHLORIDE 12.5 MG/1
12.5 TABLET ORAL EVERY 6 HOURS PRN
Status: DISCONTINUED | OUTPATIENT
Start: 2021-05-25 | End: 2021-05-28 | Stop reason: HOSPADM

## 2021-05-25 RX ORDER — POTASSIUM CHLORIDE 20 MEQ/1
40 TABLET, EXTENDED RELEASE ORAL PRN
Status: DISCONTINUED | OUTPATIENT
Start: 2021-05-25 | End: 2021-05-28 | Stop reason: HOSPADM

## 2021-05-25 RX ORDER — FAMOTIDINE 20 MG/1
20 TABLET, FILM COATED ORAL 2 TIMES DAILY
Status: DISCONTINUED | OUTPATIENT
Start: 2021-05-25 | End: 2021-05-26

## 2021-05-25 RX ORDER — ONDANSETRON 2 MG/ML
4 INJECTION INTRAMUSCULAR; INTRAVENOUS EVERY 6 HOURS PRN
Status: DISCONTINUED | OUTPATIENT
Start: 2021-05-25 | End: 2021-05-28 | Stop reason: HOSPADM

## 2021-05-25 RX ORDER — ACETAMINOPHEN 325 MG/1
650 TABLET ORAL EVERY 6 HOURS PRN
Status: DISCONTINUED | OUTPATIENT
Start: 2021-05-25 | End: 2021-05-28 | Stop reason: HOSPADM

## 2021-05-25 RX ORDER — SODIUM CHLORIDE 9 MG/ML
INJECTION, SOLUTION INTRAVENOUS CONTINUOUS
Status: DISCONTINUED | OUTPATIENT
Start: 2021-05-25 | End: 2021-05-25 | Stop reason: DRUGHIGH

## 2021-05-25 RX ORDER — TRAMADOL HYDROCHLORIDE 50 MG/1
50 TABLET ORAL EVERY 8 HOURS PRN
Status: ON HOLD | COMMUNITY
Start: 2021-05-04 | End: 2021-05-28 | Stop reason: HOSPADM

## 2021-05-25 RX ADMIN — FAMOTIDINE 20 MG: 20 TABLET, FILM COATED ORAL at 20:17

## 2021-05-25 RX ADMIN — ONDANSETRON 4 MG: 2 INJECTION INTRAMUSCULAR; INTRAVENOUS at 12:43

## 2021-05-25 RX ADMIN — SODIUM CHLORIDE: 9 INJECTION, SOLUTION INTRAVENOUS at 12:44

## 2021-05-25 RX ADMIN — CEFTRIAXONE SODIUM 1000 MG: 1 INJECTION, POWDER, FOR SOLUTION INTRAMUSCULAR; INTRAVENOUS at 14:20

## 2021-05-25 RX ADMIN — MECLIZINE 25 MG: 12.5 TABLET ORAL at 12:43

## 2021-05-25 RX ADMIN — SODIUM CHLORIDE: 9 INJECTION, SOLUTION INTRAVENOUS at 20:15

## 2021-05-25 ASSESSMENT — ENCOUNTER SYMPTOMS
DIARRHEA: 0
CONSTIPATION: 0
VOMITING: 0
ABDOMINAL PAIN: 1
TROUBLE SWALLOWING: 0
SHORTNESS OF BREATH: 0

## 2021-05-25 ASSESSMENT — PAIN SCALES - GENERAL: PAINLEVEL_OUTOF10: 0

## 2021-05-25 NOTE — PROGRESS NOTES
Transitions of Care Pharmacy Service   Medication Review    The patient's list of current home medications has been reviewed and updated. Source(s) of information: Patient/Surescripts    Please feel free to call with any questions about this encounter. Thank you. Maximsunvirgil Palma, [address] [facility]  Transitions of Care Pharmacy Service  Phone:  [phone]  Fax: [phone]            Prior to Admission medications    Medication Sig Start Date End Date Taking? Authorizing Provider   OnabotulinumtoxinA (BOTOX IJ) Inject as directed TO NECK FOR DYSTONIA. Yes Historical Provider, MD   pantoprazole (PROTONIX) 40 MG tablet Take 40 mg by mouth daily  2/9/15  Yes Historical Provider, MD   FLUoxetine (PROZAC) 20 MG capsule 20 mg daily  8/28/12  Yes Historical Provider, MD   metoprolol (LOPRESSOR) 25 MG tablet Take 25 mg by mouth daily  9/8/12  Yes Historical Provider, MD   Naproxen Sodium (ALEVE PO) Take by mouth daily as needed    Yes Historical Provider, MD   traMADol (ULTRAM) 50 MG tablet Take 50 mg by mouth every 8 hours as needed.  5/4/21   Historical Provider, MD   LORazepam (ATIVAN) 0.5 MG tablet Take 0.5 mg by mouth every 6 hours as needed  8/25/15 5/25/21  Historical Provider, MD

## 2021-05-25 NOTE — PROGRESS NOTES
Pt admitted to room per cart in good condition from ED  Oriented to room and surroundings  Bed in lowest position, wheels locked, 2/4 side rails up  Call light in reach, room free of clutter, adequate lighting provided  Denies any further questions at this time  Instructed to call out with any questions/concerns/new onset of pain and/or n/v   White board updated  Continue to monitor with hourly rounding  Bed alarm on/Fall Risk signs in place/Fall risk sticker to wrist band  Non-skid socks on/at bedside  [address] [facility] in place for patient/family to view & ask questions.

## 2021-05-25 NOTE — ED PROVIDER NOTES
EMERGENCY DEPARTMENT ENCOUNTER    Pt Name: Anthony Rincon  MRN: [id number]  [place] 1960  Date of evaluation: 5/25/21  CHIEF COMPLAINT       Chief Complaint   Patient presents with    Dizziness    Fatigue    Urinary Tract Infection     pt states UTI     HISTORY OF PRESENT ILLNESS   The patient is a 57-year-old female here with dizziness, fatigue, abdominal pain, dysuria. She states she said the symptoms for close to 2 weeks. She states she had her second Covid shot about 2 weeks ago and had a few days of fevers chills and body aches that went away. She states she has been having dizziness when she stands up described as room spinning sensation. Denies history of vertigo. Denies any recent fall or head injury. Denies being on blood thinners. Denies any neck stiffness. Denies any focal weakness numbness tingling. She has had decreased oral intake and appetite. Denies chest pain shortness of breath or cough. States she is having some left sided abdominal pain that feels similar to when she has a urinary tract infection. Denies any hematuria or back pain. She has been having dark urine. REVIEW OF SYSTEMS     Review of Systems   Constitutional: Positive for activity change, appetite change and fatigue. Negative for chills and fever. HENT: Negative for trouble swallowing. Eyes: Negative for visual disturbance. Respiratory: Negative for shortness of breath. Cardiovascular: Negative for chest pain. Gastrointestinal: Positive for abdominal pain. Negative for constipation, diarrhea and vomiting. Genitourinary: Positive for dysuria. Negative for difficulty urinating and hematuria. Musculoskeletal: Negative for neck pain. Skin: Negative for rash. Neurological: Positive for dizziness and weakness. Psychiatric/Behavioral: Negative for confusion.      PASTMEDICAL HISTORY     Past Medical History:   Diagnosis Date    Anxiety     Benign fundic gland polyps of stomach     Cataract RIGHT    Dystonia     Elevated liver enzymes     GERD (gastroesophageal reflux disease)     MVP (mitral valve prolapse)     PONV (postoperative nausea and vomiting)     Wears dentures     UPPER     SURGICAL HISTORY       Past Surgical History:   Procedure Laterality Date    CHOLECYSTECTOMY      CYSTOSCOPY  3-24-16    EYE SURGERY      CATARACT REMOVED FROM LEFT. IOL PLACED., ANISH. LASIK EYE SURG.  FINGER TRIGGER RELEASE      thumb    HYSTERECTOMY      KNEE SURGERY      right     UPPER GASTROINTESTINAL ENDOSCOPY  10/08/2012    [facility]; GERD    WRIST SURGERY      left      CURRENT MEDICATIONS       Previous Medications    FLUOXETINE (PROZAC) 20 MG CAPSULE    20 mg daily     METOPROLOL (LOPRESSOR) 25 MG TABLET    Take 25 mg by mouth daily     NAPROXEN SODIUM (ALEVE PO)    Take by mouth daily as needed     PANTOPRAZOLE (PROTONIX) 40 MG TABLET    Take 40 mg by mouth daily      ALLERGIES     is allergic to gentamicin, tetanus toxoids, and zithromax [azithromycin dihydrate]. FAMILY HISTORY     She indicated that her mother is alive. She indicated that her father is alive. She indicated that the status of her maternal grandmother is unknown. She indicated that the status of her paternal grandmother is unknown. SOCIAL HISTORY       Social History     Tobacco Use    Smoking status: Former Smoker    Smokeless tobacco: Never Used   Substance Use Topics    Alcohol use: No    Drug use: No     PHYSICAL EXAM     INITIAL VITALS: /83   Pulse 79   Temp 98.2 °F (36.8 °C) (Oral)   Resp 16   Ht 4' 10\" (1.473 m)   Wt 129 lb (58.5 kg)   SpO2 96%   BMI 26.96 kg/m²    Physical Exam  Vitals and nursing note reviewed. Constitutional:       General: She is not in acute distress. Appearance: She is ill-appearing. She is not toxic-appearing or diaphoretic. HENT:      Head: Normocephalic and atraumatic. Mouth/Throat:      Mouth: Mucous membranes are dry. Pharynx: Oropharynx is clear.    Eyes: General: No visual field deficit or scleral icterus. Extraocular Movements: Extraocular movements intact. Pupils: Pupils are equal, round, and reactive to light. Cardiovascular:      Rate and Rhythm: Normal rate and regular rhythm. Pulses: Normal pulses. Pulmonary:      Effort: Pulmonary effort is normal. No respiratory distress. Breath sounds: Normal breath sounds. Abdominal:      General: There is no distension. Palpations: Abdomen is soft. There is no mass. Tenderness: There is no abdominal tenderness. There is no right CVA tenderness, left CVA tenderness, guarding or rebound. Hernia: No hernia is present. Musculoskeletal:         General: No deformity. Normal range of motion. Cervical back: Normal range of motion and neck supple. No rigidity. Right lower leg: No edema. Left lower leg: No edema. Skin:     General: Skin is warm and dry. Capillary Refill: Capillary refill takes less than 2 seconds. Neurological:      General: No focal deficit present. Mental Status: She is alert and oriented to person, place, and time. GCS: GCS eye subscore is 4. GCS verbal subscore is 5. GCS motor subscore is 6. Cranial Nerves: No cranial nerve deficit, dysarthria or facial asymmetry. Sensory: Sensation is intact. Motor: Motor function is intact. No pronator drift. Coordination: Finger-Nose-Finger Test normal.   Psychiatric:         Thought Content: Thought content normal.         MEDICAL DECISION MAKING:          Please see ED Course below for MDM/ED course. DDx: Infection, electrolyte imbalance, intracranial mass, arrhythmia, ACS    All patient's question's and concerns were answered prior to disposition and patient and/or family expressed understanding and agreement of treatment plan.        NIH STROKE SCALE:            PROCEDURES:    Procedures    DIAGNOSTIC RESULTS   EKG:All EKG's are interpreted by the Emergency Department Physician who either signs or Co-signs this chart in the absence of a cardiologist.    Normal sinus rhythm rate of 67 normal intervals normal axis no ST elevations, subtle depression of the ST segment in V2, T wave inversions lead III, V2 through V6, abnormal EKG consider anterior ischemia, Q waves 1 aVL, poor R wave progression; no prior for comparison    RADIOLOGY:All plain film, CT, MRI, and formal ultrasound images (except ED bedside ultrasound) are read by the radiologist, see reports below, unless otherwisenoted in MDM or here. CT HEAD WO CONTRAST   Final Result   No acute intracranial abnormality. XR CHEST 1 VIEW   Final Result   No acute cardiopulmonary disease           LABS: All lab results were reviewed by myself, and all abnormals are listed below. Labs Reviewed   CBC WITH AUTO DIFFERENTIAL - Abnormal; Notable for the following components:       Result Value    Immature Granulocytes 1 (*)     All other components within normal limits   URINE RT REFLEX TO CULTURE - Abnormal; Notable for the following components:    Turbidity UA SLIGHTLY CLOUDY (*)     Urine Hgb 1+ (*)     Leukocyte Esterase, Urine MOD (*)     All other components within normal limits   CULTURE, URINE   COMPREHENSIVE METABOLIC PANEL W/ REFLEX TO MG FOR LOW K   TROP/MYOGLOBIN   CK   LACTIC ACID   MICROSCOPIC URINALYSIS       EMERGENCY DEPARTMENTCOURSE:     Patient is a 73-year-old female here with dizziness fatigue dark urine and dysuria. She appears slightly dehydrated she is afebrile nontoxic with a supple neck and she is neurologically intact. Concern is for UTI versus electrolyte imbalance, will check cardiac work-up as well as she does have T wave inversions on her EKG and a CT head. Will hydrate and treat symptomatically. EKG does have T wave inversions. She has normal troponin. No prior EKG to compare to. CT head negative. Normal lactic acid. Does not appear septic.   Urine does show moderate leukocyte esterase and white blood cells, no actual bacteria but given her symptoms concerns for UTI. She states she still feeling dizzy despite Antivert and Zofran. Given constellation of symptoms will admit. Spoke with her PCP who will place orders. Vitals:    Vitals:    05/25/21 1113   BP: 121/83   Pulse: 79   Resp: 16   Temp: 98.2 °F (36.8 °C)   TempSrc: Oral   SpO2: 96%   Weight: 129 lb (58.5 kg)   Height: 4' 10\" (1.473 m)       The patient was given the following medications while in the emergency department:  Orders Placed This Encounter   Medications    0.9 % sodium chloride infusion    meclizine (ANTIVERT) tablet 25 mg    ondansetron (ZOFRAN) injection 4 mg    cefTRIAXone (ROCEPHIN) 1000 mg IVPB in 50 mL D5W minibag     Order Specific Question:   Antimicrobial Indications     Answer:   Urinary Tract Infection     CONSULTS:  IP CONSULT TO HOSPITALIST    FINAL IMPRESSION      1. Dizziness    2. Acute cystitis without hematuria    3. T wave inversion in EKG          DISPOSITION/PLAN   DISPOSITION Decision To Admit 05/25/2021 02:06:32 PM      PATIENT REFERRED TO:  No follow-up provider specified. DISCHARGE MEDICATIONS:  New Prescriptions    No medications on file     Alda Allen MD  Attending Emergency Physician    This note was created with the assistance of a speech-recognition program. While intending to generate a document that actually reflects the content of the visit, no guarantees can be provided that every mistake has been identified and corrected by editing.                     Alda Allen MD  05/25/21 9670

## 2021-05-26 ENCOUNTER — APPOINTMENT (OUTPATIENT)
Dept: MRI IMAGING | Age: 61
DRG: 463 | End: 2021-05-26
Payer: MEDICARE

## 2021-05-26 LAB
ABSOLUTE EOS #: 0.07 K/UL (ref 0–0.44)
ABSOLUTE IMMATURE GRANULOCYTE: 0.03 K/UL (ref 0–0.3)
ABSOLUTE LYMPH #: 2.33 K/UL (ref 1.1–3.7)
ABSOLUTE MONO #: 0.37 K/UL (ref 0.1–1.2)
ANION GAP SERPL CALCULATED.3IONS-SCNC: 8 MMOL/L (ref 9–17)
BASOPHILS # BLD: 1 % (ref 0–2)
BASOPHILS ABSOLUTE: 0.05 K/UL (ref 0–0.2)
BUN BLDV-MCNC: 10 MG/DL (ref 8–23)
BUN/CREAT BLD: 11 (ref 9–20)
CALCIUM SERPL-MCNC: 8.8 MG/DL (ref 8.6–10.4)
CHLORIDE BLD-SCNC: 110 MMOL/L (ref 98–107)
CO2: 22 MMOL/L (ref 20–31)
CREAT SERPL-MCNC: 0.91 MG/DL (ref 0.5–0.9)
CULTURE: NORMAL
DIFFERENTIAL TYPE: ABNORMAL
EOSINOPHILS RELATIVE PERCENT: 1 % (ref 1–4)
GFR AFRICAN AMERICAN: >60 ML/MIN
GFR NON-AFRICAN AMERICAN: >60 ML/MIN
GFR SERPL CREATININE-BSD FRML MDRD: ABNORMAL ML/MIN/{1.73_M2}
GFR SERPL CREATININE-BSD FRML MDRD: ABNORMAL ML/MIN/{1.73_M2}
GLUCOSE BLD-MCNC: 84 MG/DL (ref 70–99)
HCT VFR BLD CALC: 36.8 % (ref 36.3–47.1)
HEMOGLOBIN: 11.3 G/DL (ref 11.9–15.1)
IMMATURE GRANULOCYTES: 1 %
INR BLD: 1.1
LV EF: 65 %
LVEF MODALITY: NORMAL
LYMPHOCYTES # BLD: 43 % (ref 24–43)
Lab: NORMAL
MAGNESIUM: 1.9 MG/DL (ref 1.6–2.6)
MCH RBC QN AUTO: 27 PG (ref 25.2–33.5)
MCHC RBC AUTO-ENTMCNC: 30.7 G/DL (ref 28.4–34.8)
MCV RBC AUTO: 87.8 FL (ref 82.6–102.9)
MONOCYTES # BLD: 7 % (ref 3–12)
NRBC AUTOMATED: 0 PER 100 WBC
PDW BLD-RTO: 12.9 % (ref 11.8–14.4)
PLATELET # BLD: 153 K/UL (ref 138–453)
PLATELET ESTIMATE: ABNORMAL
PMV BLD AUTO: 10.8 FL (ref 8.1–13.5)
POTASSIUM SERPL-SCNC: 4 MMOL/L (ref 3.7–5.3)
PROTHROMBIN TIME: 14.2 SEC (ref 11.5–14.2)
RBC # BLD: 4.19 M/UL (ref 3.95–5.11)
RBC # BLD: ABNORMAL 10*6/UL
SEG NEUTROPHILS: 47 % (ref 36–65)
SEGMENTED NEUTROPHILS ABSOLUTE COUNT: 2.6 K/UL (ref 1.5–8.1)
SODIUM BLD-SCNC: 140 MMOL/L (ref 135–144)
SPECIMEN DESCRIPTION: NORMAL
WBC # BLD: 5.5 K/UL (ref 3.5–11.3)
WBC # BLD: ABNORMAL 10*3/UL

## 2021-05-26 PROCEDURE — 36415 COLL VENOUS BLD VENIPUNCTURE: CPT

## 2021-05-26 PROCEDURE — 93880 EXTRACRANIAL BILAT STUDY: CPT

## 2021-05-26 PROCEDURE — 6370000000 HC RX 637 (ALT 250 FOR IP): Performed by: FAMILY MEDICINE

## 2021-05-26 PROCEDURE — 97535 SELF CARE MNGMENT TRAINING: CPT

## 2021-05-26 PROCEDURE — 2580000003 HC RX 258: Performed by: FAMILY MEDICINE

## 2021-05-26 PROCEDURE — 80048 BASIC METABOLIC PNL TOTAL CA: CPT

## 2021-05-26 PROCEDURE — 85610 PROTHROMBIN TIME: CPT

## 2021-05-26 PROCEDURE — 70551 MRI BRAIN STEM W/O DYE: CPT

## 2021-05-26 PROCEDURE — 97530 THERAPEUTIC ACTIVITIES: CPT

## 2021-05-26 PROCEDURE — 2060000000 HC ICU INTERMEDIATE R&B

## 2021-05-26 PROCEDURE — 85025 COMPLETE CBC W/AUTO DIFF WBC: CPT

## 2021-05-26 PROCEDURE — 83735 ASSAY OF MAGNESIUM: CPT

## 2021-05-26 PROCEDURE — 97166 OT EVAL MOD COMPLEX 45 MIN: CPT

## 2021-05-26 PROCEDURE — 95816 EEG AWAKE AND DROWSY: CPT

## 2021-05-26 PROCEDURE — 93306 TTE W/DOPPLER COMPLETE: CPT

## 2021-05-26 PROCEDURE — 6360000002 HC RX W HCPCS: Performed by: FAMILY MEDICINE

## 2021-05-26 RX ORDER — FLUOXETINE HYDROCHLORIDE 20 MG/1
20 CAPSULE ORAL DAILY
Status: DISCONTINUED | OUTPATIENT
Start: 2021-05-26 | End: 2021-05-28 | Stop reason: HOSPADM

## 2021-05-26 RX ORDER — PANTOPRAZOLE SODIUM 40 MG/1
40 TABLET, DELAYED RELEASE ORAL
Status: DISCONTINUED | OUTPATIENT
Start: 2021-05-26 | End: 2021-05-28 | Stop reason: HOSPADM

## 2021-05-26 RX ADMIN — CEFTRIAXONE SODIUM 1000 MG: 1 INJECTION, POWDER, FOR SOLUTION INTRAMUSCULAR; INTRAVENOUS at 15:06

## 2021-05-26 RX ADMIN — FAMOTIDINE 20 MG: 20 TABLET, FILM COATED ORAL at 09:27

## 2021-05-26 RX ADMIN — METOPROLOL TARTRATE 25 MG: 25 TABLET, FILM COATED ORAL at 09:27

## 2021-05-26 RX ADMIN — FLUOXETINE 20 MG: 20 CAPSULE ORAL at 09:27

## 2021-05-26 RX ADMIN — PANTOPRAZOLE SODIUM 40 MG: 40 TABLET, DELAYED RELEASE ORAL at 06:37

## 2021-05-26 ASSESSMENT — PAIN SCALES - GENERAL
PAINLEVEL_OUTOF10: 0
PAINLEVEL_OUTOF10: 0

## 2021-05-26 NOTE — PROGRESS NOTES
EEG Report    Last Name  Vanessa Li Gender female    First Name  Christofer Issa Record Number [id number]   Date of Birth  1960 Referring Physician Dr. Alfaro   Study Date 5/26/2021 StudyTime  36   Facility Location [facility] EEG Number    Type of Study EEG Floor  Progressive     Technical Specifications  Technician [address] of consciousness alert   Sleep deprived? no   Hyperventilation tested? No   Photic stim tested? Yes   EEG recording Standard 10-20 electrode placement    Duration of recording 25 minutes   Technician classification RRT   EEG complete?  Yes       Clinical History  ***    Medications    Current Facility-Administered Medications:     FLUoxetine (PROZAC) capsule 20 mg, 20 mg, Oral, Daily, Carlyle Matos DO, 20 mg at 05/26/21 7001    metoprolol tartrate (LOPRESSOR) tablet 25 mg, 25 mg, Oral, Daily, Carlyle Matos DO, 25 mg at 05/26/21 0927    pantoprazole (PROTONIX) tablet 40 mg, 40 mg, Oral, QA AC, Zach Matos DO, 40 mg at 05/26/21 6579    0.9 % sodium chloride infusion, , Intravenous, Continuous, Carlyle Matos DO, Last Rate: 75 mL/hr at 05/25/21 2015, New Bag at 05/25/21 2015    sodium chloride flush 0.9 % injection 5-40 mL, 5-40 mL, Intravenous, 2 times per day, Carlyle Matos,     sodium chloride flush 0.9 % injection 10 mL, 10 mL, Intravenous, PRN, Carlyle Matos DO    0.9 % sodium chloride infusion, 25 mL, Intravenous, PRN, Carlyle Matos, DO    potassium chloride (KLOR-CON M) extended release tablet 40 mEq, 40 mEq, Oral, PRN **OR** potassium bicarb-citric acid (EFFER-K) effervescent tablet 40 mEq, 40 mEq, Oral, PRN **OR** potassium chloride 10 mEq/100 mL IVPB (Peripheral Line), 10 mEq, Intravenous, PRN, Carlyle Matos, DO    enoxaparin (LOVENOX) injection 40 mg, 40 mg, Subcutaneous, Daily, Zach Matos DO    promethazine (PHENERGAN) tablet 12.5 mg, 12.5 mg, Oral, Q6H PRN **OR** ondansetron (ZOFRAN) injection 4 mg, 4 mg, Intravenous, Q6H PRN, Levonia Nett Neverauskas, DO    polyethylene glycol (GLYCOLAX) packet 17 g, 17 g, Oral, Daily PRN, Levonia Nett Neverauskas, DO    acetaminophen (TYLENOL) tablet 650 mg, 650 mg, Oral, Q6H PRN **OR** acetaminophen (TYLENOL) suppository 650 mg, 650 mg, Rectal, Q6H PRN, Levonia Nett Neverauskas, DO    cefTRIAXone (ROCEPHIN) 1000 mg IVPB in 50 mL D5W minibag, 1,000 mg, Intravenous, Q24H, Ayan Burch DO        Physician Interpretation    General EEG Report  EEG study was performed using the 1020 electrode placement system in patient who was *** at time of study. No abnormal behavior or movements noted during the study. Activation procedures included photic stimulation and hyperventilation.      Type of EEG:  ***     Description   Wakefulness: Alpha activity ***  Sleep: ***  Photic stimulation: ***  Hyperventilation: ***  Fast Activity: ***  Slowing: ***  Non-Epileptiform Abnormality: ***  Epileptiform Discharge: ***  Ictal: ***    General Impression  ***    Pantera Kumari MD

## 2021-05-26 NOTE — FLOWSHEET NOTE
Pt sleeping upon attempted visit. Writer says prayer at door. Chaplains will remain available to offer spiritual and emotional support as needed.        05/26/21 8805   Encounter Summary   Services provided to: Patient   Referral/Consult From: Jacki   Continue Visiting   (5/26/21 sleeping)   Routine   Type Initial   Assessment Sleeping   Intervention Prayer     Electronically signed by Corwin Joseph, on 5/26/2021 at 4:48 PM.  Inna  [phone]

## 2021-05-26 NOTE — CARE COORDINATION
Case Management Initial Discharge Plan  Sharla Canseco,         Readmission Risk              Risk of Unplanned Readmission:  9             Met with:patient to discuss discharge plans. Information verified: address, contacts, phone number, , insurance Yes  PCP: Ayan Burch DO  Date of last visit: over 1 year ago in person talked to him on the phone has been seeing since he opened    Insurance Provider: [employer]    Discharge Planning  Current Residence:  2 story home with spouse  Living Arrangements:  Spouse/Significant Other   Home has 2 stories/6 stairs to climb into home   Support Systems:  None  Current Services PTA:  na Agency: na  Patient able to perform ADL's:Independent  DME in home:  na  DME used to aid ambulation prior to admission:   Na   DME used during admission:  na    Potential Assistance Needed:  N/A    Pharmacy: [facility]    Potential Assistance Purchasing Medications:  No  Does patient want to participate in local refill/ meds to beds program?  Yes    Patient agreeable to home care: No  [place] of choice provided:  n/a      Type of Home Care Services:  None  Patient expects to be discharged to:  home    Prior SNF/Rehab Placement and Facility: no   Agreeable to SNF/Rehab: No  [place] of choice provided: n/a   Evaluation: n/a    Expected Discharge date:  21  Follow Up Appointment: Best Day/ Time: Monday PM    Transportation provider: spouse   Transportation arrangements needed for discharge: No    Discharge Plan: Poss dc . Pt is from home with spouse, independent, drives. No DME.      Rocephin IV   IVF  EEG      Electronically signed by Lizett Curran RN on 21 at 12:41 PM EDT

## 2021-05-26 NOTE — PROGRESS NOTES
Occupational Therapy  DATE: 2021    NAME: Sisi Borges  MRN: [id number]   : 1960    Patient not seen this date for Occupational Therapy due to:  [] Blood transfusion in progress  [] Cancel by RN  [] Hemodialysis  []  Refusal by Patient   [] Spine Precautions   [] Strict Bedrest  [] Surgery  [] Testing      [x] Other- RN Araceli Angulo requested eval later this date as pt was busy with staff/tests this am and wanting to eat; continue to follow        [] PT being discontinued at this time. Patient independent. No further needs. [] PT being discontinued at this time as the patient has been transferred to hospice care. No further needs.     Antonella Rivas, OT

## 2021-05-26 NOTE — ACP (ADVANCE CARE PLANNING)
Advance Care Planning     Advance Care Planning Activator (Inpatient)  Conversation Note      Date of ACP Conversation: 5/26/2021     Conversation Conducted with: Patient with Decision Making Capacity    ACP Activator: Gege Calix RN        Health Care Decision Maker: Self spouse     Current Designated Health Care Decision Maker: self    Click here to complete Healthcare Decision Makers including section of the Healthcare Decision Maker Relationship (ie \"Primary\")      Care Preferences    Ventilation: \"If you were in your present state of health and suddenly became very ill and were unable to breathe on your own, what would your preference be about the use of a ventilator (breathing machine) if it were available to you? \"      Would the patient desire the use of ventilator (breathing machine)?: yes    \"If your health worsens and it becomes clear that your chance of recovery is unlikely, what would your preference be about the use of a ventilator (breathing machine) if it were available to you? \"     Would the patient desire the use of ventilator (breathing machine)?: Yes      Resuscitation  \"CPR works best to restart the heart when there is a sudden event, like a heart attack, in someone who is otherwise healthy. Unfortunately, CPR does not typically restart the heart for people who have serious health conditions or who are very sick. \"    \"In the event your heart stopped as a result of an underlying serious health condition, would you want attempts to be made to restart your heart (answer \"yes\" for attempt to resuscitate) or would you prefer a natural death (answer \"no\" for do not attempt to resuscitate)? \" yes       [] Yes   [] No   Educated Patient / Berta Candelaria regarding differences between Advance Directives and portable DNR orders.     Length of ACP Conversation in minutes:      Conversation Outcomes:  [] ACP discussion completed  [x] Existing advance directive reviewed with patient; no changes to patient's RX faxed per Don Sharma's protocol

## 2021-05-26 NOTE — PROGRESS NOTES
Physical Therapy  DATE: 2021    NAME: Carlo Shrestha  MRN: [id number]   : 1960    Patient not seen this date for Physical Therapy due to:  [] Blood transfusion in progress  [] Cancel by RN  [] Hemodialysis  []  Refusal by Patient   [] Spine Precautions   [] Strict Bedrest  [] Surgery  [] Testing      [x] Other: Patient fatigued and just got back to bed after working with OT, will check back as able        [] PT being discontinued at this time. Patient independent. No further needs. [] PT being discontinued at this time as the patient has been transferred to hospice care. No further needs.     Jennifer Due, PT

## 2021-05-26 NOTE — PLAN OF CARE
Problem: Falls - Risk of:  Goal: Will remain free from falls  Description: Will remain free from falls  Outcome: Ongoing     Problem: Falls - Risk of:  Goal: Absence of physical injury  Description: Absence of physical injury  Outcome: Ongoing     Problem: Fluid Volume - Imbalance:  Goal: Absence of imbalanced fluid volume signs and symptoms  Description: Absence of imbalanced fluid volume signs and symptoms  Outcome: Ongoing     Problem: Sensory:  Goal: General experience of comfort will improve  Description: General experience of comfort will improve  Outcome: Ongoing     Problem: Urinary Elimination:  Goal: Signs and symptoms of infection will decrease  Description: Signs and symptoms of infection will decrease  Outcome: Ongoing     Problem: Urinary Elimination:  Goal: Ability to reestablish a normal urinary elimination pattern will improve - after catheter removal  Description: Ability to reestablish a normal urinary elimination pattern will improve  Outcome: Ongoing     Problem: Urinary Elimination:  Goal: Complications related to the disease process, condition or treatment will be avoided or minimized  Description: Complications related to the disease process, condition or treatment will be avoided or minimized  Outcome: Ongoing

## 2021-05-26 NOTE — PROGRESS NOTES
Occupational Therapy   Occupational Therapy Initial Assessment  Date: 2021   Patient Name: Denice Xavier  MRN: [id number]     : 1960    Date of Service: 2021     CJ Moseley reports patient is medically stable for therapy treatment this date. Chart reviewed prior to treatment and patient is agreeable for therapy. All lines intact and patient positioned comfortably at end of treatment. All patient needs addressed prior to ending therapy session. Due to recent hospitalization and medical condition, pt would benefit from additional therapy at time of discharge to ensure safety. Please refer to the AM-PAC score for current functional status. Discharge Recommendations:  Patient would benefit from continued therapy after discharge  OT Equipment Recommendations  Equipment Needed: Yes  Mobility Devices: Jj Paling; ADL Assistive Devices  Walker: Rolling  ADL Assistive Devices: Toileting - 3-in-1 Commode;Emergency Alert System, reacher, long handled shoe horn, long handled bathing sponge     Assessment   Performance deficits / Impairments: Decreased safe awareness;Decreased balance;Decreased endurance;Decreased ADL status; Decreased functional mobility   Assessment: Skilled OT is indicated to increase overall safety awareness in function as well as strength, balance, act yoli to return home at prior level of function. Prognosis: Fair  Decision Making: Medium Complexity  OT Education: OT Role;Plan of Care;ADL Adaptive Strategies;Transfer Training  Patient Education: call light/fall prevention, safety in function, pursed lip breathing, recommendations for continued therapy, benefits of being up OOB, edema management, pacing  REQUIRES OT FOLLOW UP: Yes  Activity Tolerance  Activity Tolerance:  (Pt limited by dizziness)  Activity Tolerance: fair minus   Safety Devices  Safety Devices in place: Yes  Type of devices: Bed alarm in place; All fall risk precautions in place; Patient at risk for falls; Left supine to seated, especially when head is lifted)  Standing Balance: Minimal assistance (dizziness from seated to standing with posterior lean/loss of balance)  Standing Balance  Time: approx 3 min for functional mobility to toilet    Functional Mobility  Functional - Mobility Device: No device (Recommend RW to increase safety and prevent falls/support with dizziness.)  Activity:  (EOB to toilet, back to bed)  Assist Level: Minimal assistance  Functional Mobility Comments: MIN verbal cueing/tactile assist for pacing/slowing down, weight shifting, pursed lip breathing for safety in function    Toilet Transfers  Toilet - Technique: Ambulating  Equipment Used: Grab bars  Toilet Transfer: Minimal assistance  Toilet Transfers Comments: MIN verbal cueing/tactile assist for pacing/slowing down, pursed lip breathing, taking a break with each change in position to allow dizziness to resolve, use of grab bar for support when sitting to support safety and fall prevention    ADL  Grooming: Supervision;Setup (seated)  UE Bathing: Supervision;Setup (seated)  LE Bathing: Minimal assistance  UE Dressing: Minimal assistance (with hosp gown)  LE Dressing: Minimal assistance  Toileting: Minimal assistance (Pt was able to urinate, stand for hygiene, pull up shorts.  Required min assist for hosp gown mgt and balance support due to dizziness with position changes.)  Additional Comments: MIN verbal cueing/tactile support for pacing/slowing down, use of grab bar     Tone RUE  RUE Tone: Normotonic  Tone LUE  LUE Tone: Normotonic  Coordination  Movements Are Fluid And Coordinated: Yes     Bed mobility  Bridging: Stand by assistance  Rolling to Left: Stand by assistance  Rolling to Right: Stand by assistance  Supine to Sit: Contact guard assistance (Pt reports dizziness upon change in position, resolves within ~ 30 sec)  Sit to Supine: Contact guard assistance (Pt reports dizziness upon change in position, resolves within ~ 30 sec)  Scooting: Contact guard assistance  Comment: MIN verbal cueing/tactile assist for pacing/slowing down, pursed lip breathing, and use of bed rail for safety and fall prevention     Transfers  Stand Step Transfers: Minimal assistance  Sit to stand: Minimal assistance  Stand to sit: Minimal assistance  Transfer Comments: MIN verbal cueing/tactile assist for pacing/slowing down, pursed lip breathing, weight shifting, taking a break with each change in position to allow dizziness to resolve to increase safety and help with fall prevention     Cognition  Overall Cognitive Status: Exceptions  Arousal/Alertness: Appropriate responses to stimuli  Following Commands:  Follows all commands without difficulty  Attention Span: Appears intact  Memory: Appears intact  Safety Judgement: Decreased awareness of need for assistance;Decreased awareness of need for safety  Problem Solving: Assistance required to generate solutions;Assistance required to correct errors made;Assistance required to identify errors made;Assistance required to implement solutions  Insights: Decreased awareness of deficits  Initiation: Does not require cues  Sequencing: Does not require cues  Perception  Overall Perceptual Status: WFL     Sensation  Overall Sensation Status: WFL        LUE PROM (degrees)  LUE PROM: WFL  RUE AROM (degrees)  RUE AROM : WFL  LUE Strength  Gross LUE Strength: WFL  RUE Strength  Gross RUE Strength: WFL  RUE Strength Comment: BUE grossly tested 4/5                   Plan   Plan  Times per week: 4-5x/week, 1x/day as yoli  Current Treatment Recommendations: Positioning, Safety Education & Training, Functional Mobility Training, Self-Care / ADL, Patient/Caregiver Education & Training, Balance Training, Cognitive/Perceptual Training, Neuromuscular Re-education, Endurance Training             AM-PAC Score        AM-[facility] Inpatient Daily Activity Raw Score: 19 (05/26/21 1131)  AM-PAC Inpatient ADL T-Scale Score : 40.22 (05/26/21 1131)  ADL Inpatient CMS 0-100% Score: 42.8 (05/26/21 1131)  ADL Inpatient CMS G-Code Modifier : CK (05/26/21 1131)    Goals  Short term goals  Time Frame for Short term goals: by discharge, pt to demo  Short term goal 1: increase BUE strength by 1/2 grade and I with HEP program with use of handouts to improve/maintain BUE strength to assist with ADL completion. Short term goal 2: ADL transfer/functional mobility to SBA with use of AD as needed for ADL completion and safety. Short term goal 3: toileting to SBA with use of grab bar and AD as needed. Short term goal 4: UB ADL to mod I/I and LB ADL to SBA with use of AE/AD as needed. Short term goal 5: standing balance to SBA for ~ 5 min for ADL completion and safety. Long term goals  Long term goal 1: Pt/caregiver to demo understanding of EC/WS tech, AE/AD recommendations, fall prevention with use of handouts as needed. Patient Goals   Patient goals :  To go home & be with dogs       Therapy Time   Individual Concurrent Group Co-treatment   Time In 1007         Time Out 1042 (+ 10 min for chart review/RN communication)         Minutes 1300 Hannah Snow

## 2021-05-26 NOTE — H&P
History and Physical [facility] PROGRESSIVE CARE     CHIEF COMPLAINT: Dizziness, Fatigue, and Urinary Tract Infection (pt states UTI)      Patient Status: Inpatient [101]      HISTORY OF PRESENT ILLNESS:    From the ER this patient is a 56 y.o. female whopresents with  Chief Complaint   Patient presents with    Dizziness    Fatigue    Urinary Tract Infection     pt states UTI   68-year-old female here with dizziness, fatigue, abdominal pain, dysuria. She states she said the symptoms for close to 2 weeks. She states she had her second Covid shot about 2 weeks ago and had a few days of fevers chills and body aches that went away. She states she has been having dizziness when she stands up described as room spinning sensation. Denies history of vertigo. Denies any recent fall or head injury. Denies being on blood thinners. Denies any neck stiffness. Denies any focal weakness numbness tingling. She has had decreased oral intake and appetite. Denies chest pain shortness of breath or cough. States she is having some left sided abdominal pain that feels similar to when she has a urinary tract infection. Denies any hematuria or back pain. She has been having dark urine. .  Jhony Urban MD)    This is a 68-year-old  female who comes in with a complaint of dizziness. Is been ongoing for 2 weeks but got worse yesterday. Patient has a history of hypertension so she was referred to the emergency department for further evaluation. At the time she stated that it felt like she was on a boat there was no spinning. She had been eating fine. Patient states there was no nausea or vomiting. No one else was sick at home. She has been immunized for COVID-19 about 2 weeks earlier. She denies any chest pain or heart palpitations. The patient states that she has had no fever chills no diarrhea. Patient currently is feeling pretty good.           Past Medical History:   Diagnosis Date    Anxiety     Benign fundic gland polyps of stomach     Cataract RIGHT    Dystonia     Elevated liver enzymes     GERD (gastroesophageal reflux disease)     MVP (mitral valve prolapse)     PONV (postoperative nausea and vomiting)     Wears dentures     UPPER       Past Surgical History:   Procedure Laterality Date    CHOLECYSTECTOMY      CYSTOSCOPY  3-24-16    EYE SURGERY      CATARACT REMOVED FROM LEFT. IOL PLACED., ANISH. LASIK EYE SURG.  FINGER TRIGGER RELEASE      thumb    HYSTERECTOMY      KNEE SURGERY      right     UPPER GASTROINTESTINAL ENDOSCOPY  10/08/2012    [facility]; GERD    WRIST SURGERY      left      Prior to Admission medications    Medication Sig Start Date End Date Taking? Authorizing Provider   OnabotulinumtoxinA (BOTOX IJ) Inject as directed TO NECK FOR DYSTONIA. Yes Historical Provider, MD   pantoprazole (PROTONIX) 40 MG tablet Take 40 mg by mouth daily  2/9/15  Yes Historical Provider, MD   FLUoxetine (PROZAC) 20 MG capsule 20 mg daily  8/28/12  Yes Historical Provider, MD   metoprolol (LOPRESSOR) 25 MG tablet Take 25 mg by mouth daily  9/8/12  Yes Historical Provider, MD   Naproxen Sodium (ALEVE PO) Take by mouth daily as needed    Yes Historical Provider, MD   traMADol (ULTRAM) 50 MG tablet Take 50 mg by mouth every 8 hours as needed.  5/4/21   Historical Provider, MD     Allergies   Allergen Reactions    Gentamicin Other (See Comments)     Chest discomfort    Tetanus Toxoids Nausea And Vomiting and Other (See Comments)    Zithromax [Azithromycin Dihydrate] Nausea And Vomiting and Other (See Comments)       Social History     Socioeconomic History    Marital status:      Spouse name: Not on file    Number of children: Not on file    Years of education: Not on file    Highest education level: Not on file   Occupational History    Not on file   Tobacco Use    Smoking status: Former Smoker    Smokeless tobacco: Never Used   Substance and Sexual Activity    Alcohol use: No    Drug use: No    Sexual activity: Not on file   Other Topics Concern    Not on file   Social History Narrative    Not on file     Social Determinants of Health     Financial Resource Strain:     Difficulty of Paying Living Expenses:    Food Insecurity:     Worried About Running Out of Food in the Last Year:     [address] in the Last Year:    Transportation Needs:     Lack of Transportation (Medical):  Lack of Transportation (Non-Medical):    Physical Activity:     Days of Exercise per Week:     Minutes of Exercise per Session:    Stress:     Feeling of Stress :    Social Connections:     Frequency of Communication with Friends and Family:     Frequency of Social Gatherings with Friends and Family:     Attends Yazidism Services:     Active Member of Clubs or Organizations:     Attends Club or Organization Meetings:     Marital Status:    Intimate Partner Violence:     Fear of Current or Ex-Partner:     Emotionally Abused:     Physically Abused:     Sexually Abused:        Family History   Problem Relation Age of Onset    Cancer Mother     Cancer Father     Cancer Maternal Grandmother     Cancer Paternal Grandmother        REVIEW OF SYSTEMS:  Review of Systems  Constitutional: Positive for activity change, appetite change and fatigue. Negative for chills and fever. HENT: Negative for trouble swallowing. Eyes: Negative for visual disturbance. Respiratory: Negative for shortness of breath. Cardiovascular: Negative for chest pain. Gastrointestinal: Positive for abdominal pain. Negative for constipation, diarrhea and vomiting. Genitourinary: Positive for dysuria. Negative for difficulty urinating and hematuria. Musculoskeletal: Negative for neck pain. Skin: Negative for rash. Neurological: Positive for dizziness and weakness. Psychiatric/Behavioral: Negative for confusion.    Tony Breen MD)    PHYSICAL EXAM:    /66   Pulse 64   Temp 97.7 °F (36.5 °C) (Oral)   Resp 18   Ht 4' 10\" (1.473 m)   Wt 129 lb (58.5 kg)   SpO2 96%   BMI 26.96 kg/m²     Physical Exam  Constitutional:       General: She is not in acute distress. Appearance: She is well-developed. She is not diaphoretic. HENT:      Head: Normocephalic and atraumatic. Comments:   Tremorous voice with head tremor noted. Right Ear: External ear normal.      Left Ear: External ear normal.      Nose: Nose normal.   Eyes:      Conjunctiva/sclera: Conjunctivae normal.      Pupils: Pupils are equal, round, and reactive to light. Neck:      Thyroid: No thyromegaly. Cardiovascular:      Rate and Rhythm: Normal rate and regular rhythm. Heart sounds: Normal heart sounds. No murmur heard. No friction rub. No gallop. Pulmonary:      Effort: Pulmonary effort is normal. No respiratory distress. Breath sounds: Normal breath sounds. No wheezing or rales. Chest:      Chest wall: No tenderness. Abdominal:      General: Bowel sounds are normal. There is no distension. Palpations: Abdomen is soft. There is no mass. Tenderness: There is no abdominal tenderness. There is no guarding or rebound. Genitourinary:     Comments: Not Examined  Musculoskeletal:         General: No tenderness or deformity. Normal range of motion. Cervical back: Normal range of motion and neck supple. Skin:     General: Skin is warm and dry. Findings: No rash. Neurological:      Mental Status: She is alert and oriented to person, place, and time. Cranial Nerves: No cranial nerve deficit. Motor: No abnormal muscle tone. Coordination: Coordination normal.      Deep Tendon Reflexes: Reflexes are normal and symmetric. Reflexes normal.   Psychiatric:         Behavior: Behavior normal.         Thought Content:  Thought content normal.         Judgment: Judgment normal.         Labs:        Lab Results   Component Value Date/Time    WBC 5.5 05/26/2021 03:54 AM Additional signs and symptoms: Pt has involuntary tremors FINDINGS: BRAIN/VENTRICLES: There is no acute intracranial hemorrhage, mass effect or midline shift. No abnormal extra-axial fluid collection. The gray-white differentiation is maintained without evidence of an acute infarct. There is no evidence of hydrocephalus. ORBITS: The visualized portion of the orbits demonstrate no acute abnormality. SINUSES: The visualized paranasal sinuses and mastoid air cells demonstrate no acute abnormality. SOFT TISSUES/SKULL:  No acute abnormality of the visualized skull or soft tissues. No acute intracranial abnormality. XR CHEST 1 VIEW    Result Date: 5/25/2021  EXAMINATION: ONE XRAY VIEW OF THE CHEST 5/25/2021 12:12 pm COMPARISON: 08/21/2012 HISTORY: ORDERING SYSTEM PROVIDED HISTORY: fatigue, weakness TECHNOLOGIST PROVIDED HISTORY: fatigue, weakness Reason for Exam: port ap upright fatigue weakness Acuity: Unknown Type of Exam: Unknown FINDINGS: No focal airspace consolidation, pleural effusion or pneumothorax is present. The heart is normal in size. The pulmonary vascularity is within normal limits. Osseous structures are unremarkable.      No acute cardiopulmonary disease     VL DUP CAROTID BILATERAL    Result Date: 5/26/2021    [facility]  Vascular Carotid Procedure   Patient Name Garett Garcia     Date of Study           05/26/2021               Jason Mehran   Date of      1960  Gender                  Female  Birth   Age          56 year(s)  Race                       Room Number  1008        Height:                 57.87 inch, 147 cm   Corporate ID [id number]    Weight:                 129 pounds, 58.5 kg  #   Patient Acct [de-identified]   BSA:        1.51 m^2    BMI:        27.08 kg/m^2  #   MR #         [id number]     Sonographer             Garrison Lloyd   Accession #  [id number]  Interpreting Physician  Dong Farrell   Referring                Referring Physician Vignesh Garcia,  Nurse  Practitioner  Procedure Type of Study:   Cerebral: Carotid, Carotid Scan Bilateral.  Indications for Study:Dizziness. Patient Status: In Patient. Technical Quality:Adequate visualization. Conclusions   Summary   Minimal 1-15 % stenosis of the bilateral internal carotid artery. Patent vertebral arteries bilaterally with antegrade flow. Signature   ----------------------------------------------------------------  Electronically signed by Edson Lambert on  05/26/2021 09:21 AM  ----------------------------------------------------------------   ----------------------------------------------------------------  Electronically signed by Jostin Santillan(Interpreting physician)  on 05/26/2021 05:47 PM  ----------------------------------------------------------------  Findings:   Right Impression:                    Left Impression:  Common carotid artery appears        Common carotid appears normal.  normal.                              Carotid scan shows minimal  Carotid scan shows minimal           heterogeneous plaque formation at the  heterogeneous plaque formation at    bifurcation, origin of the left  the bifurcation, origin of the right internal carotid artery. internal carotid artery. External carotid artery appears  External carotid artery appears      normal.  normal.                              Vertebral artery flow is antegrade . Vertebral artery flow is antegrade . Velocities are measured in cm/s ; Diameters are measured in cm Carotid Right Measurements +------------+--------+--------+--------+-------+------------+-------------+ ! Location    ! PSV     ! EDV     ! Angle   ! RI     !%Stenosis   ! Tortuosity   ! +------------+--------+--------+--------+-------+------------+-------------+ ! Prox CCA    !72.49   !24.09   !        !0.67   !            !             ! +------------+--------+--------+--------+-------+------------+-------------+ ! Dist CCA !62.59   !22.99   !        !0.63   !            !             ! +------------+--------+--------+--------+-------+------------+-------------+ ! Bulb        !55.99   !16.39   !        !0.71   !            !             ! +------------+--------+--------+--------+-------+------------+-------------+ ! Prox ICA    !90.45   !30.58   !        !0.66   !            !             ! +------------+--------+--------+--------+-------+------------+-------------+ ! Dist ICA    !93.69   !35.44   !        !0.62   !            !             ! +------------+--------+--------+--------+-------+------------+-------------+ ! Prox ECA    !72.49   !11.99   !        !0.83   !            !             ! +------------+--------+--------+--------+-------+------------+-------------+ ! Vertebral   !53.82   !21.9    !        !0.59   !            !             ! +------------+--------+--------+--------+-------+------------+-------------+   - There is antegrade vertebral flow noted on the right side. - Additional Measurements:ICAPSV/CCAPSV 1.29. ICAEDV/CCAEDV 1.47. Carotid Left Measurements +-----------+--------+-------+-------+------+-----------+------------------+ ! Location   ! PSV     ! EDV    ! Angle  ! RI    !%Stenosis  ! Tortuosity        ! +-----------+--------+-------+-------+------+-----------+------------------+ ! Prox CCA   !86.63   !21.88  !       !0.75  !           !                  ! +-----------+--------+-------+-------+------+-----------+------------------+ ! Dist CCA   !64.62   !23.18  !       !0.64  !           !                  ! +-----------+--------+-------+-------+------+-----------+------------------+ ! Bulb       !77.57   !29.65  !       !0.62  !           !                  ! +-----------+--------+-------+-------+------+-----------+------------------+ ! Prox ICA   !81.45   !30.95  !       !0.62  !           !                  ! +-----------+--------+-------+-------+------+-----------+------------------+ ! Dist ICA   !106.63  !43.53  ! !0.59  !           !                  ! +-----------+--------+-------+-------+------+-----------+------------------+ ! Prox ECA   !86.63   !19.29  !       !0.78  !           !                  ! +-----------+--------+-------+-------+------+-----------+------------------+ ! Vertebral  !59.29   !19.69  !       !0.67  !           !                  ! +-----------+--------+-------+-------+------+-----------+------------------+   - There is antegrade vertebral flow noted on the left side. - Additional Measurements:ICAPSV/CCAPSV 1.23. ICAEDV/CCAEDV 1.99. MRI BRAIN WO CONTRAST    Result Date: 5/26/2021  EXAMINATION: MRI OF THE BRAIN WITHOUT CONTRAST  5/26/2021 8:58 am TECHNIQUE: Multiplanar multisequence MRI of the brain was performed without the administration of intravenous contrast. COMPARISON: CT brain performed 05/25/2021. HISTORY: ORDERING SYSTEM PROVIDED HISTORY: Dizziness TECHNOLOGIST PROVIDED HISTORY: Dizziness Decision Support Exception - unselect if not a suspected or confirmed emergency medical condition->Emergency Medical Condition (MA) Reason for Exam: Dizziness X 2 weeks, left side weakness. Acuity: Acute Type of Exam: Subsequent/Follow-up FINDINGS: INTRACRANIAL STRUCTURES/VENTRICLES: The sellar and suprasellar structures, optic chiasm, corpus callosum, pineal gland, tectum, and midline brainstem structures are unremarkable. The craniocervical junction is unremarkable. There is no acute hemorrhage, mass effect, or midline shift. There is satisfactory overall gray-white matter differentiation. The ventricular structures are symmetric and unremarkable. The infratentorial structures including the cerebellopontine angles and internal auditory canals are unremarkable. There is no abnormal restricted diffusion. There is no abnormal blooming artifact on susceptibility weighted imaging. ORBITS: The visualized portion of the orbits demonstrate no acute abnormality.  SINUSES: The visualized paranasal sinuses Status:   Standing     Number of Occurrences:   1    Trop/Myoglobin     Standing Status:   Standing     Number of Occurrences:   1    CK     Standing Status:   Standing     Number of Occurrences:   1    Lactic Acid     Standing Status:   Standing     Number of Occurrences:   1    Urinalysis Reflex to Culture     Standing Status:   Standing     Number of Occurrences:   1    Microscopic Urinalysis     Standing Status:   Standing     Number of Occurrences:   1    Basic Metabolic Panel w/ Reflex to MG     Standing Status:   Standing     Number of Occurrences:   1    CBC auto differential     Standing Status:   Standing     Number of Occurrences:   9    Protime-INR     Standing Status:   Standing     Number of Occurrences:   1    D-Dimer, Quantitative     Standing Status:   Standing     Number of Occurrences:   1    Magnesium     Standing Status:   Standing     Number of Occurrences:   7    DIET GENERAL;     Standing Status:   Standing     Number of Occurrences:   1    Vital signs per unit routine     Standing Status:   Standing     Number of Occurrences:   1    Notify physician     Notify physician for pulse less than 50 or greater than 120, respiratory rate less than 12 or greater than 25, oral temperature greater than 101.3 F (38.5 C) , urinary output less than 120 mL in four hours, systolic BP less than 90 or greater than 427, diastolic BP less than 50 or greater than 100.      Standing Status:   Standing     Number of Occurrences:   1    Up with assistance     Standing Status:   Standing     Number of Occurrences:   07824    Intake and output     Call for urine ouput less than 120ml in 4 hours     Standing Status:   Standing     Number of Occurrences:   12    Place intermittent pneumatic compression device     When appropriate prophylactic therapy cannot be provided due to patient limitations, serial screening Duplex Doppler Ultrasound studies of the legs may be considered to assess for development of DVT.     Standing Status:   Standing     Number of Occurrences:   1    Telemetry monitoring - continuous duration     Standing Status:   Standing     Number of Occurrences:   1     Order Specific Question:   Patient may go off unit without monitor     Answer:   No    Full Code     Standing Status:   Standing     Number of Occurrences:   1    Inpatient consult to Hospitalist     Standing Status:   Standing     Number of Occurrences:   1     Order Specific Question:   Reason for Consult? Answer:   admit    Inpatient consult to Dietitian     eval nutritional status     Standing Status:   Standing     Number of Occurrences:   1     Order Specific Question:   Reason for Consult? Answer:   Diet Education     Order Specific Question:   Specify Type of Diet Education:     Answer:   diet    OT eval and treat     Standing Status:   Standing     Number of Occurrences:   1    PT evaluation and treat     Standing Status:   Standing     Number of Occurrences:   1    Initiate Oxygen Therapy Protocol     Initiate oxygen therapy if the patient has 1) SpO2 is less than 90%, 2) Cyanosis, Chest Pain, Dyspnea, or Altered level of consciousness AND SpO2 checked and it is less than 90%, or 3) patient on Home oxygen. To initiate oxygen therapy: nurse enters RT51 Nasal cannula oxygen order using Per Protocol order mode (if indication Home Oxygen then change L/min to same amount at home and change Wean to Room Air to No). Notify provider if initiate oxygen therapy unless already on Home Oxygen. Standing Status:   Standing     Number of Occurrences:   7    Incentive spirometry     Standing Status:   Standing     Number of Occurrences:   7    Pulse oximetry, continuous     Standing Status:   Standing     Number of Occurrences:   37    Holter Monitor 48 Hour     Standing Status:   Standing     Number of Occurrences:   1     Order Specific Question:   Reason for Exam?     Answer:    Other     Order Specific Question:   Reason for Exam?     Answer:   Bradycardia    EKG 12 Lead     Standing Status:   Standing     Number of Occurrences:   1     Order Specific Question:   Reason for Exam?     Answer:   Chest pain    ECHO Complete 2D W Doppler W Color     Standing Status:   Standing     Number of Occurrences:   1     Order Specific Question:   Reason for exam:     Answer:   dizzyness    EEG     Standing Status:   Standing     Number of Occurrences:   1    PATIENT STATUS (FROM ED OR OR/PROCEDURAL) Inpatient     Standing Status:   Standing     Number of Occurrences:   1     Order Specific Question:   Patient Class     Answer:   Inpatient [101]     Order Specific Question:   REQUIRED: Diagnosis     Answer:   Dehydration [276.51. ICD-9-CM]     Order Specific Question:   Estimated Length of Stay     Answer:   Estimated stay of more than 2 midnights     Order Specific Question:   Admitting Provider     Answer:   Chago Wang [[id number]]     Order Specific Question:   Telemetry/Cardiac Monitoring Required?      Answer:   Yes       Scheduled Meds:   FLUoxetine  20 mg Oral Daily    metoprolol tartrate  25 mg Oral Daily    pantoprazole  40 mg Oral QAM AC    sodium chloride flush  5-40 mL Intravenous 2 times per day    enoxaparin  40 mg Subcutaneous Daily    cefTRIAXone (ROCEPHIN) IV  1,000 mg Intravenous Q24H       Continuous Infusions:   sodium chloride 75 mL/hr at 05/25/21 2015    sodium chloride         PRN Meds:  sodium chloride flush, sodium chloride, potassium chloride **OR** potassium alternative oral replacement **OR** potassium chloride, promethazine **OR** ondansetron, polyethylene glycol, acetaminophen **OR** acetaminophen     Ashley Matos,   on 5/26/2021 at  7:24 PM EDT     This note was created with the assistance of a speech-recognition program. Although the intention is to generate a document that actually reflects the content of the visit, no guarantees can be provided that every mistake has been identified and corrected by editing.

## 2021-05-27 ENCOUNTER — APPOINTMENT (OUTPATIENT)
Dept: CT IMAGING | Age: 61
DRG: 463 | End: 2021-05-27
Payer: MEDICARE

## 2021-05-27 LAB
ABSOLUTE EOS #: 0.08 K/UL (ref 0–0.44)
ABSOLUTE IMMATURE GRANULOCYTE: 0.02 K/UL (ref 0–0.3)
ABSOLUTE LYMPH #: 2.3 K/UL (ref 1.1–3.7)
ABSOLUTE MONO #: 0.36 K/UL (ref 0.1–1.2)
BASOPHILS # BLD: 1 % (ref 0–2)
BASOPHILS ABSOLUTE: 0.07 K/UL (ref 0–0.2)
DIFFERENTIAL TYPE: ABNORMAL
EOSINOPHILS RELATIVE PERCENT: 1 % (ref 1–4)
HCT VFR BLD CALC: 35.8 % (ref 36.3–47.1)
HEMOGLOBIN: 11.2 G/DL (ref 11.9–15.1)
IMMATURE GRANULOCYTES: 0 %
LYMPHOCYTES # BLD: 40 % (ref 24–43)
MAGNESIUM: 2 MG/DL (ref 1.6–2.6)
MCH RBC QN AUTO: 27.9 PG (ref 25.2–33.5)
MCHC RBC AUTO-ENTMCNC: 31.3 G/DL (ref 28.4–34.8)
MCV RBC AUTO: 89.1 FL (ref 82.6–102.9)
MONOCYTES # BLD: 6 % (ref 3–12)
NRBC AUTOMATED: 0 PER 100 WBC
PDW BLD-RTO: 12.6 % (ref 11.8–14.4)
PLATELET # BLD: 178 K/UL (ref 138–453)
PLATELET ESTIMATE: ABNORMAL
PMV BLD AUTO: 11.5 FL (ref 8.1–13.5)
RBC # BLD: 4.02 M/UL (ref 3.95–5.11)
RBC # BLD: ABNORMAL 10*6/UL
SEG NEUTROPHILS: 52 % (ref 36–65)
SEGMENTED NEUTROPHILS ABSOLUTE COUNT: 2.9 K/UL (ref 1.5–8.1)
WBC # BLD: 5.7 K/UL (ref 3.5–11.3)
WBC # BLD: ABNORMAL 10*3/UL

## 2021-05-27 PROCEDURE — 36415 COLL VENOUS BLD VENIPUNCTURE: CPT

## 2021-05-27 PROCEDURE — 6360000002 HC RX W HCPCS: Performed by: FAMILY MEDICINE

## 2021-05-27 PROCEDURE — 6360000004 HC RX CONTRAST MEDICATION: Performed by: INTERNAL MEDICINE

## 2021-05-27 PROCEDURE — 6370000000 HC RX 637 (ALT 250 FOR IP): Performed by: FAMILY MEDICINE

## 2021-05-27 PROCEDURE — 2580000003 HC RX 258: Performed by: INTERNAL MEDICINE

## 2021-05-27 PROCEDURE — 97161 PT EVAL LOW COMPLEX 20 MIN: CPT

## 2021-05-27 PROCEDURE — 71260 CT THORAX DX C+: CPT

## 2021-05-27 PROCEDURE — 83735 ASSAY OF MAGNESIUM: CPT

## 2021-05-27 PROCEDURE — 2580000003 HC RX 258: Performed by: FAMILY MEDICINE

## 2021-05-27 PROCEDURE — 85025 COMPLETE CBC W/AUTO DIFF WBC: CPT

## 2021-05-27 PROCEDURE — 97116 GAIT TRAINING THERAPY: CPT

## 2021-05-27 PROCEDURE — 95816 EEG AWAKE AND DROWSY: CPT | Performed by: PSYCHIATRY & NEUROLOGY

## 2021-05-27 PROCEDURE — 2060000000 HC ICU INTERMEDIATE R&B

## 2021-05-27 PROCEDURE — 4A10X4Z MONITORING OF CENTRAL NERVOUS ELECTRICAL ACTIVITY, EXTERNAL APPROACH: ICD-10-PCS | Performed by: PSYCHIATRY & NEUROLOGY

## 2021-05-27 RX ORDER — SODIUM CHLORIDE 0.9 % (FLUSH) 0.9 %
10 SYRINGE (ML) INJECTION PRN
Status: DISCONTINUED | OUTPATIENT
Start: 2021-05-27 | End: 2021-05-28 | Stop reason: HOSPADM

## 2021-05-27 RX ORDER — 0.9 % SODIUM CHLORIDE 0.9 %
100 INTRAVENOUS SOLUTION INTRAVENOUS ONCE
Status: DISCONTINUED | OUTPATIENT
Start: 2021-05-27 | End: 2021-05-28 | Stop reason: HOSPADM

## 2021-05-27 RX ADMIN — METOPROLOL TARTRATE 25 MG: 25 TABLET, FILM COATED ORAL at 09:12

## 2021-05-27 RX ADMIN — CEFTRIAXONE SODIUM 1000 MG: 1 INJECTION, POWDER, FOR SOLUTION INTRAMUSCULAR; INTRAVENOUS at 14:13

## 2021-05-27 RX ADMIN — SODIUM CHLORIDE 80 ML: 9 INJECTION, SOLUTION INTRAVENOUS at 18:44

## 2021-05-27 RX ADMIN — SODIUM CHLORIDE, PRESERVATIVE FREE 10 ML: 5 INJECTION INTRAVENOUS at 21:34

## 2021-05-27 RX ADMIN — SODIUM CHLORIDE, PRESERVATIVE FREE 10 ML: 5 INJECTION INTRAVENOUS at 18:45

## 2021-05-27 RX ADMIN — IOPAMIDOL 75 ML: 755 INJECTION, SOLUTION INTRAVENOUS at 18:38

## 2021-05-27 RX ADMIN — PANTOPRAZOLE SODIUM 40 MG: 40 TABLET, DELAYED RELEASE ORAL at 05:55

## 2021-05-27 RX ADMIN — FLUOXETINE 20 MG: 20 CAPSULE ORAL at 09:12

## 2021-05-27 ASSESSMENT — PAIN SCALES - GENERAL
PAINLEVEL_OUTOF10: 0

## 2021-05-27 NOTE — PROCEDURES
EEG Report    Last Name  Williams Buenrostro Gender female    First Name  Christofer Issa Record Number [id number]   Date of Birth  1960 Referring Physician Dr. Pankaj Davis   Study Date 5/26/2021 StudyTime  36   Facility Location [facility] EEG Number     Type of Study EEG Floor  Progressive      Technical Specifications  Technician [address] of consciousness alert   Sleep deprived? no   Hyperventilation tested? No   Photic stim tested? Yes   EEG recording Standard 10-20 electrode placement    Duration of recording 25 minutes   Technician classification RRT   EEG complete? Yes       Clinical History  69-year-old female here with dizziness, fatigue, abdominal pain, dysuria.  She states she said the symptoms for close to 2 weeks.  She states she had her second Covid shot about 2 weeks ago and had a few days of fevers chills and body aches that went away. Si Dixie states she has been having dizziness when she stands up described as room spinning sensation.  Denies history of vertigo.  Denies any recent fall or head injury.  Denies being on blood thinners.  Denies any neck stiffness.  Denies any focal weakness numbness tingling.  She has had decreased oral intake and appetite.  Denies chest pain shortness of breath or cough.  States she is having some left sided abdominal pain that feels similar to when she has a urinary tract infection.  Denies any hematuria or back pain.  She has been having dark urine. Quoc Samayoa MD).     Medications    Current Facility-Administered Medications:     FLUoxetine (PROZAC) capsule 20 mg, 20 mg, Oral, Daily, Damien Abrams Neverauskas, DO, 20 mg at 05/27/21 0912    metoprolol tartrate (LOPRESSOR) tablet 25 mg, 25 mg, Oral, Daily, Damien Abrams Neverauskas, DO, 25 mg at 05/27/21 0912    pantoprazole (PROTONIX) tablet 40 mg, 40 mg, Oral, Zach MEADOWS Neverauskas, DO, 40 mg at 05/27/21 0555    sodium chloride flush 0.9 % injection 5-40 mL, 5-40 mL, Intravenous, 2 times per day, Maria Isabel Acostakas, DO    sodium chloride flush 0.9 % injection 10 mL, 10 mL, Intravenous, PRN, Maria Isabel Duenas Neverameekas, DO    0.9 % sodium chloride infusion, 25 mL, Intravenous, PRN, Maria Isabel Duenas Neverauskas, DO    potassium chloride (KLOR-CON M) extended release tablet 40 mEq, 40 mEq, Oral, PRN **OR** potassium bicarb-citric acid (EFFER-K) effervescent tablet 40 mEq, 40 mEq, Oral, PRN **OR** potassium chloride 10 mEq/100 mL IVPB (Peripheral Line), 10 mEq, Intravenous, PRN, Maria Isabel Duenas Neverameekas, DO    enoxaparin (LOVENOX) injection 40 mg, 40 mg, Subcutaneous, Daily, Zach Matos, DO    promethazine (PHENERGAN) tablet 12.5 mg, 12.5 mg, Oral, Q6H PRN **OR** ondansetron (ZOFRAN) injection 4 mg, 4 mg, Intravenous, Q6H PRN, Maria Isabel Acostakas, DO    polyethylene glycol (GLYCOLAX) packet 17 g, 17 g, Oral, Daily PRN, Maria Isabel Duenas Neverameekas, DO    acetaminophen (TYLENOL) tablet 650 mg, 650 mg, Oral, Q6H PRN **OR** acetaminophen (TYLENOL) suppository 650 mg, 650 mg, Rectal, Q6H PRN, Maria Isabel Acostakas, DO    cefTRIAXone (ROCEPHIN) 1000 mg IVPB in 50 mL D5W minibag, 1,000 mg, Intravenous, Q24H, Maria Isabel Matos, DO, Stopped at 05/27/21 1700        Physician Interpretation    General EEG Report  EEG study was performed using the 10-20 electrode placement system in patient who was awake at time of study. No abnormal behavior or movements noted during the study. Activation procedures included photic stimulation and hyperventilation. Type of EEG: Routine inpatient EEG    Description   Wakefulness: Alpha activity consisted of through 12 Hz posterior dominant alpha with low amplitude fast background activity. No significant asymmetry appreciated. Prominent frontal beta artifact. Sleep: Drowsy sleep with note of loss of alpha activity and presence of some vertex waves. .  Photic stimulation: Mild occipital driving response at moderate frequencies without any induced activity.   Fast

## 2021-05-27 NOTE — PROGRESS NOTES
Physical Therapy    Facility/Department: [facility]  Initial Assessment    NAME: Teresita Mariano  : 1960  MRN: [id number]    Date of Service: 2021    Discharge Recommendations:           Assessment   Body structures, Functions, Activity limitations: Decreased functional mobility   Assessment: Skilled therapy needed to maximize independence with functional mobility to ensure safe discharge. Prognosis: Good  Decision Making: Low Complexity  Exam: AROM, strength, mobility, balance, endurance, AM-PAC, Tinetti  Clinical Presentation: evolving  PT Education: Goals;Transfer Training;General Safety;Gait Training;PT Role;Plan of Care  REQUIRES PT FOLLOW UP: Yes  Activity Tolerance  Activity Tolerance: Patient Tolerated treatment well       Patient Diagnosis(es): The primary encounter diagnosis was Dizziness. Diagnoses of Acute cystitis without hematuria and T wave inversion in EKG were also pertinent to this visit. has a past medical history of Anxiety, Benign fundic gland polyps of stomach, Cataract, Dystonia, Elevated liver enzymes, GERD (gastroesophageal reflux disease), MVP (mitral valve prolapse), PONV (postoperative nausea and vomiting), and Wears dentures. has a past surgical history that includes Hysterectomy; knee surgery; Wrist surgery; Finger trigger release; Upper gastrointestinal endoscopy (10/08/2012); Cholecystectomy; eye surgery; and Cystocopy (3-24-16). Restrictions  Restrictions/Precautions  Restrictions/Precautions: Up as Tolerated, General Precautions  Position Activity Restriction  Other position/activity restrictions: RUE IV  Vision/Hearing  Vision: Impaired (lasix and cataract surgery in both eyes)  Vision Exceptions: Wears glasses at all times; Wears glasses for reading  Hearing: Exceptions to [facility]  Hearing Exceptions: Hard of hearing/hearing concerns (Molly Pagoda to understand accents, read lips)     Subjective  General  Chart Reviewed: Yes  Patient assessed for rehabilitation services?: Yes  Family / Caregiver Present: No  Follows Commands: Within Functional Limits  General Comment  Comments: RN states patient appropriate for therapy  Subjective  Subjective: Patient very pleasand and agreeable to work with therapy, hoping to be d/c today  Pain Screening  Patient Currently in Pain: Denies          Orientation  Orientation  Overall Orientation Status: Within Functional Limits  Social/Functional History  Social/Functional History  Lives With: Spouse (, supportive, retired, volunteers for [employer])  Type of Home: [address]: Two level, [facility] to Live on Main level with bedroom/bathroom (lives on second floor, [employer] [place] on second. 14 stairs up with right hand rail)  Home Access: Stairs to enter without rails (front door)  Entrance Stairs - Number of Steps: 3  Bathroom Shower/Tub: Tub/Shower unit  Bathroom Toilet: Standard (vanity close by)  [employer] Help From: Family (children that live close by, daughter is an LPN)  ADL Assistance: Independent  Homemaking Assistance: Independent ( helps with some)  Homemaking Responsibilities: Yes  Ambulation Assistance: Independent  Transfer Assistance: Independent  Active : Yes  Mode of Transportation: Car  Occupation: Retired  Type of occupation: [employer]  Leisure & Hobbies: play bingo, play slots on phone  Additional Comments: Pt has 3 small dogs aged 1-15.   Cognition   Cognition  Overall Cognitive Status: WFL    Objective     Observation/Palpation  Observation: resting in bed, very minimal dizziness, states much improved    AROM RLE (degrees)  RLE AROM: WFL  AROM LLE (degrees)  LLE AROM : WFL  AROM RUE (degrees)  RUE General AROM: refer to OT  AROM LUE (degrees)  LUE General AROM: refer to OT  Strength RLE  Strength RLE: WFL  Strength LLE  Strength LLE: WFL  Strength RUE  Strength RUE: WFL  Strength LUE  Strength LUE: WFL  Motor Control  Gross Motor?: WFL  Coordination  Heel to Shin: Normal  Sensation  Overall

## 2021-05-27 NOTE — PROGRESS NOTES
Progress Note(Hospital)    [facility]     Admition Status: Inpatient [101]     CC:Dizziness, Fatigue, and Urinary Tract Infection (pt states UTI)    HCC:  Currently doing well her dizziness is much better when she gets up real quick she gets kind of dizzy. So far her investigation central nervous system wise comes back negative. Review of her EKG does show diffuse ST-T wave changes. I am not 100% sure if this is ischemic or not her cardiac echo was negative. But she may warrant further investigation. EEG is still pending.           Current Facility-Administered Medications:     FLUoxetine (PROZAC) capsule 20 mg, 20 mg, Oral, Daily, Roane Yulissa Neverauskas, DO, 20 mg at 05/27/21 0912    metoprolol tartrate (LOPRESSOR) tablet 25 mg, 25 mg, Oral, Daily, Roane Yulissa Neverauskas, DO, 25 mg at 05/27/21 0912    pantoprazole (PROTONIX) tablet 40 mg, 40 mg, Oral, [facility], Zach Durans, DO, 40 mg at 05/27/21 0555    sodium chloride flush 0.9 % injection 5-40 mL, 5-40 mL, Intravenous, 2 times per day, Roane Yulissa Neverauskas, DO    sodium chloride flush 0.9 % injection 10 mL, 10 mL, Intravenous, PRN, Roane Yulissa Neverauskas, DO    0.9 % sodium chloride infusion, 25 mL, Intravenous, PRN, Roane Yulissa Neverauskas, DO    potassium chloride (KLOR-CON M) extended release tablet 40 mEq, 40 mEq, Oral, PRN **OR** potassium bicarb-citric acid (EFFER-K) effervescent tablet 40 mEq, 40 mEq, Oral, PRN **OR** potassium chloride 10 mEq/100 mL IVPB (Peripheral Line), 10 mEq, Intravenous, PRN, Roane Sia Neverauskas, DO    enoxaparin (LOVENOX) injection 40 mg, 40 mg, Subcutaneous, Daily, Zach Matos, DO    promethazine (PHENERGAN) tablet 12.5 mg, 12.5 mg, Oral, Q6H PRN **OR** ondansetron (ZOFRAN) injection 4 mg, 4 mg, Intravenous, Q6H PRN, Maria Isabel Acostakas, DO    polyethylene glycol (GLYCOLAX) packet 17 g, 17 g, Oral, Daily PRN, Maria Isabel Duenas Neverameekas, DO    acetaminophen (TYLENOL) tablet 650 mg, 650 mg, Oral, Q6H PRN **OR** acetaminophen (TYLENOL) suppository 650 mg, 650 mg, Rectal, Q6H PRN, Shanae Matos DO    cefTRIAXone (ROCEPHIN) 1000 mg IVPB in 50 mL D5W minibag, 1,000 mg, Intravenous, Q24H, Zach LALIT DO Shawna, Stopped at 05/26/21 1536    O:  /62   Pulse 56   Temp 97.7 °F (36.5 °C) (Oral)   Resp 14   Ht 4' 10\" (1.473 m)   Wt 129 lb (58.5 kg)   SpO2 96%   BMI 26.96 kg/m²     Intake/Output Summary (Last 24 hours) at 5/27/2021 0931  Last data filed at 5/27/2021 4503  Gross per 24 hour   Intake 420 ml   Output --   Net 420 ml       Physical Exam  Constitutional:       General: She is not in acute distress. Appearance: She is well-developed. HENT:      Head: Normocephalic and atraumatic. Neck:      Vascular: No JVD. Cardiovascular:      Rate and Rhythm: Normal rate and regular rhythm. Heart sounds: No murmur heard. No friction rub. Pulmonary:      Effort: Pulmonary effort is normal. No respiratory distress. Breath sounds: Normal breath sounds. No stridor. Abdominal:      General: Bowel sounds are normal.      Palpations: Abdomen is soft. Genitourinary:     Comments: No Examined  Skin:     General: Skin is warm and dry. Psychiatric:         Mood and Affect: Mood normal.         Behavior: Behavior normal.         Thought Content:  Thought content normal.         Judgment: Judgment normal.         Labs:      Lab Results   Component Value Date/Time    WBC 5.7 05/27/2021 05:14 AM    HGB 11.2 (L) 05/27/2021 05:14 AM    HCT 35.8 (L) 05/27/2021 05:14 AM     05/27/2021 05:14 AM     04/14/2012 10:09 AM    NEUTROABS 2.90 05/27/2021 05:14 AM     Lab Results   Component Value Date/Time     05/26/2021 03:54 AM    K 4.0 05/26/2021 03:54 AM     (H) 05/26/2021 03:54 AM    CREATININE 0.91 (H) 05/26/2021 03:54 AM    BUN 10 05/26/2021 03:54 AM    GLUCOSE 84 05/26/2021 03:54 AM    GLUCOSE 91 04/14/2012 10:09 AM     No results found for: PROBNP  Lab Results   Component Value Date    CRP 91.7 09/30/2015    .9 09/29/2015     Lab Results   Component Value Date    INR 1.1 05/26/2021     No results for input(s): PROCAL in the last 72 hours. Rad:    ECHO Complete 2D W Doppler W Color    Result Date: 5/27/2021  [facility] Transthoracic Echocardiography Report (TTE)  Patient Name Garett Garcia     Date of Study             05/26/2021               Chelsea Lexington   Date of      1960  Gender                    Female  Birth   Age          56 year(s)  Race                         Room Number  1008        Height:                   57.87 inch, 147 cm   Corporate ID [id number]    Weight:                   129 pounds, 58.5 kg  #   Patient Acct [de-identified]   BSA:         1.51 m^2     BMI:       27.08 kg/m^2  #   MR #         [id number]     Caitlin Nix   Accession #  [id number]  Interpreting Physician    Sb Panda   Fellow                   Referring Nurse                           Practitioner   Interpreting             Referring Physician       Donita Plaza  Type of Study   TTE procedure:2D Echocardiogram, M-Mode, Doppler, Color Doppler. Procedure Date Date: 05/26/2021 Start: 08:16 AM Study Location: [address] [place] [address] Technical Quality: Adequate visualization Indications:Dizziness. History / Tech. Comments: Procedure explained to patient. Patient Status: Inpatient Height: 57.87 inches Weight: 128.99 pounds BSA: 1.51 m^2 BMI: 27.08 kg/m^2 CONCLUSIONS Summary Left ventricle is normal in size Global left ventricular systolic function is normal Estimated ejection fraction is 65 % . No significant valvular regurgitation or stenosis seen. No pericardial effusion seen. Normal aortic root dimension.  Signature ----------------------------------------------------------------------------  Electronically signed by Kayla Ramon) on 2021  08:41 AM ---------------------------------------------------------------------------- ----------------------------------------------------------------------------  Electronically signed by [facility] physician) on  2021 08:44 AM ---------------------------------------------------------------------------- FINDINGS Left Atrium Left atrium is normal in size. Left Ventricle Left ventricle is normal in size Global left ventricular systolic function is normal Estimated ejection fraction is 65 % . Right Atrium Right atrium is normal in size. Right Ventricle Normal right ventricular size and function. Mitral Valve Normal mitral valve structure and function. Mild mitral regurgitation. Aortic Valve Normal aortic valve structure and function without stenosis or regurgitation. Tricuspid Valve Normal tricuspid valve structure and function. Trivial tricuspid regurgitation. Pulmonic Valve The pulmonic valve is normal in structure. No pulmonic insufficiency. Pericardial Effusion No pericardial effusion seen. Miscellaneous Normal aortic root dimension.  M-mode / 2D Measurements & Calculations:   LVIDd:4.2 cm(3.7 - 5.6 cm)       Diastolic NBZXZD:57.5 ml  TLXMK:4.8 cm(2.2 - 4.0 cm)       Systolic SDACFT:05.5 ml  LEVQ:3.1 cm(0.6 - 1.1 cm)        Aortic Root:2.7 cm(2.0 - 3.7 cm)  LVPWd:1 cm(0.6 - 1.1 cm)         LA Dimension: 3.6 cm(1.9 - 4.0 cm)  Fractional Shortenin.95 %    LA volume/Index: 36.5 ml /24m^2  Calculated LVEF (%): 67.07 %   Mitral:                                    Aortic   Valve Area (P1/2-Time): 3.61 cm^2          Peak Velocity: 1.11 m/s  Peak E-Wave: 0.85 m/s                      Peak Gradient: 4.93 mmHg  Peak A-Wave: 0.71 m/s  E/A Ratio: 1.2  Peak Gradient: 2.92 mmHg  Deceleration Time: 187 msec  P1/2t: 61 msec  Septal Wall E' velocity:0.07 m/s Lateral Wall E' velocity:0.07 m/s    CT HEAD WO CONTRAST    Result Date: 2021  EXAMINATION: CT OF THE HEAD WITHOUT CONTRAST  5/25/2021 12:32 pm TECHNIQUE: CT of the head was performed without the administration of intravenous contrast. Dose modulation, iterative reconstruction, and/or weight based adjustment of the mA/kV was utilized to reduce the radiation dose to as low as reasonably achievable. COMPARISON: 02/21/2019 HISTORY: ORDERING SYSTEM PROVIDED HISTORY: dizzy TECHNOLOGIST PROVIDED HISTORY: dizzy Decision Support Exception - unselect if not a suspected or confirmed emergency medical condition->Emergency Medical Condition (MA) Reason for Exam: Dizziness, fatigue Acuity: Acute Type of Exam: Initial Additional signs and symptoms: Pt has involuntary tremors FINDINGS: BRAIN/VENTRICLES: There is no acute intracranial hemorrhage, mass effect or midline shift. No abnormal extra-axial fluid collection. The gray-white differentiation is maintained without evidence of an acute infarct. There is no evidence of hydrocephalus. ORBITS: The visualized portion of the orbits demonstrate no acute abnormality. SINUSES: The visualized paranasal sinuses and mastoid air cells demonstrate no acute abnormality. SOFT TISSUES/SKULL:  No acute abnormality of the visualized skull or soft tissues. No acute intracranial abnormality. XR CHEST 1 VIEW    Result Date: 5/25/2021  EXAMINATION: ONE XRAY VIEW OF THE CHEST 5/25/2021 12:12 pm COMPARISON: 08/21/2012 HISTORY: ORDERING SYSTEM PROVIDED HISTORY: fatigue, weakness TECHNOLOGIST PROVIDED HISTORY: fatigue, weakness Reason for Exam: port ap upright fatigue weakness Acuity: Unknown Type of Exam: Unknown FINDINGS: No focal airspace consolidation, pleural effusion or pneumothorax is present. The heart is normal in size. The pulmonary vascularity is within normal limits. Osseous structures are unremarkable.      No acute cardiopulmonary disease     VL DUP CAROTID BILATERAL    Result Date: 5/26/2021    [facility]  Vascular Carotid Procedure   Patient Name Patricia Chavarria     Date of Study           05/26/2021               NICOL THURSTON   Date of      1960  Gender                  Female  Birth   Age          56 year(s)  Race                       Room Number  1008        Height:                 57.87 inch, 147 cm   Corporate ID [id number]    Weight:                 129 pounds, 58.5 kg  #   Patient Acct [de-identified]   BSA:        1.51 m^2    BMI:        27.08 kg/m^2  #   MR #         [id number]     Sonographer             Benji Azevedo   Accession #  [id number]  Interpreting Physician  Nidhi Lino   Referring                Referring Physician     Alfred Murray,  Nurse  Practitioner  Procedure Type of Study:   Cerebral: Carotid, Carotid Scan Bilateral.  Indications for Study:Dizziness. Patient Status: In Patient. Technical Quality:Adequate visualization. Conclusions   Summary   Minimal 1-15 % stenosis of the bilateral internal carotid artery. Patent vertebral arteries bilaterally with antegrade flow. Signature   ----------------------------------------------------------------  Electronically signed by Edson Childress on  05/26/2021 09:21 AM  ----------------------------------------------------------------   ----------------------------------------------------------------  Electronically signed by Natalie SantillanInterpreting physician)  on 05/26/2021 05:47 PM  ----------------------------------------------------------------  Findings:   Right Impression:                    Left Impression:  Common carotid artery appears        Common carotid appears normal.  normal.                              Carotid scan shows minimal  Carotid scan shows minimal           heterogeneous plaque formation at the  heterogeneous plaque formation at    bifurcation, origin of the left  the bifurcation, origin of the right internal carotid artery. internal carotid artery.              External carotid artery appears  External carotid artery !       !0.75  !           !                  ! +-----------+--------+-------+-------+------+-----------+------------------+ ! Dist CCA   !64.62   !23.18  !       !0.64  !           !                  ! +-----------+--------+-------+-------+------+-----------+------------------+ ! Bulb       !77.57   !29.65  !       !0.62  !           !                  ! +-----------+--------+-------+-------+------+-----------+------------------+ ! Prox ICA   !81.45   !30.95  !       !0.62  !           !                  ! +-----------+--------+-------+-------+------+-----------+------------------+ ! Dist ICA   !106.63  !43.53  !       !0.59  !           !                  ! +-----------+--------+-------+-------+------+-----------+------------------+ ! Prox ECA   !86.63   !19.29  !       !0.78  !           !                  ! +-----------+--------+-------+-------+------+-----------+------------------+ ! Vertebral  !59.29   !19.69  !       !0.67  !           !                  ! +-----------+--------+-------+-------+------+-----------+------------------+   - There is antegrade vertebral flow noted on the left side. - Additional Measurements:ICAPSV/CCAPSV 1.23. ICAEDV/CCAEDV 1.99. MRI BRAIN WO CONTRAST    Result Date: 5/26/2021  EXAMINATION: MRI OF THE BRAIN WITHOUT CONTRAST  5/26/2021 8:58 am TECHNIQUE: Multiplanar multisequence MRI of the brain was performed without the administration of intravenous contrast. COMPARISON: CT brain performed 05/25/2021. HISTORY: ORDERING SYSTEM PROVIDED HISTORY: Dizziness TECHNOLOGIST PROVIDED HISTORY: Dizziness Decision Support Exception - unselect if not a suspected or confirmed emergency medical condition->Emergency Medical Condition (MA) Reason for Exam: Dizziness X 2 weeks, left side weakness.  Acuity: Acute Type of Exam: Subsequent/Follow-up FINDINGS: INTRACRANIAL STRUCTURES/VENTRICLES: The sellar and suprasellar structures, optic chiasm, corpus callosum, pineal gland, tectum, and midline

## 2021-05-27 NOTE — PROGRESS NOTES
Nutrition Note    Type and Reason for Visit: Positive Nutrition Screen, Consult (Eval and treat; diet education)    Nutrition screen completed by nursing. Issue that was identified on the screen was not confirmed and does not appear to be a problem at this time. Patient admission related to dehydration. RN reports patient had a UTI with nausea, vomiting and diarrhea but those symptoms have resolved. Patient does not have any diet education needs. Continue General diet. Patient will be re-evaluated based on length of stay or if otherwise requested.          Kelley MARCHN, RDN, LDN  Lead Clinical Dietitian  RD Office Phone [phone]

## 2021-05-27 NOTE — FLOWSHEET NOTE
Patient is awake while reclining and watching television. Patient states that she is feeling much better and expects to be discharged tomorrow. Patient seems to be coping well and reports a spouse and two grown sons for support. Patient denies any spiritual or emotional concerns. Writer provides supportive conversation and nurtures hope. Patient expresses appreciation for the visit. Spiritual care will follow as needed. 05/26/21 2016   Encounter Summary   Services provided to: Patient   [facility]   Support System Spouse; Children   Continue Visiting   (5/26/21)   Complexity of Encounter Low   Length of Encounter 15 minutes   Routine   Type Follow up   Assessment Approachable   Intervention Active listening;Explored feelings, thoughts, concerns;Explored coping resources;Nurtured hope   Outcome Expressed gratitude

## 2021-05-27 NOTE — CONSULTS
Cardiovascular Consult Note     TODAY'S DATE: 5/27/2021    Patient name: Melia Devine   YOB: 1960  Date of admission:  5/25/2021       Patient seen, examined. Previous clinical entries reviewed. All available laboratory, imaging and ancillary data reviewed. Reason for Consult:  Abnormal EKG     History of present Illness:     Melia Devine is a 56 y.o. female with history of anxiety and reported MVP presented to ER with fatigue and increasing shortness of breath. On routine evaluation, she was noted to have an abnormal EKG with ST-T abnormality in the septal leads and cardiology was consulted for further care. She denies any angina. New clear new heart failure symptoms including orthopnea or PND. She had an echocardiogram that showed normal LVEF with no LV systolic or RV abnormalities. She does have some palpitations with any clear rhythm abnormalities on monitor. Past Medical History:    has a past medical history of Anxiety, Benign fundic gland polyps of stomach, Cataract, Dystonia, Elevated liver enzymes, GERD (gastroesophageal reflux disease), MVP (mitral valve prolapse), PONV (postoperative nausea and vomiting), and Wears dentures. Surgical History:     Past Surgical History:   Procedure Laterality Date    CHOLECYSTECTOMY      CYSTOSCOPY  3-24-16    EYE SURGERY      CATARACT REMOVED FROM LEFT. IOL PLACED., ANISH. LASIK EYE SURG.     FINGER TRIGGER RELEASE      thumb    HYSTERECTOMY      KNEE SURGERY      right     UPPER GASTROINTESTINAL ENDOSCOPY  10/08/2012    [facility]; GERD    WRIST SURGERY      left        Medications:   Scheduled Meds:   FLUoxetine  20 mg Oral Daily    metoprolol tartrate  25 mg Oral Daily    pantoprazole  40 mg Oral QAM AC    sodium chloride flush  5-40 mL Intravenous 2 times per day    enoxaparin  40 mg Subcutaneous Daily    cefTRIAXone (ROCEPHIN) IV  1,000 mg Intravenous Q24H     Continuous Infusions:   sodium chloride        Outpatient Medications Marked as Taking for the 5/25/21 encounter [facility] Encounter)   Medication Sig Dispense Refill    OnabotulinumtoxinA (BOTOX IJ) Inject as directed TO NECK FOR DYSTONIA.      pantoprazole (PROTONIX) 40 MG tablet Take 40 mg by mouth daily       FLUoxetine (PROZAC) 20 MG capsule 20 mg daily       metoprolol (LOPRESSOR) 25 MG tablet Take 25 mg by mouth daily       Naproxen Sodium (ALEVE PO) Take by mouth daily as needed          Allergies:   Gentamicin, Tetanus toxoids, and Zithromax [azithromycin dihydrate]    Social History:    reports that she has quit smoking. She has never used smokeless tobacco. She reports that she does not drink alcohol and does not use drugs. Family History:    family history includes Cancer in her father, maternal grandmother, mother, and paternal grandmother. Review of Systems:     Constitutional: No fever/chills. HENT: No headache, neck pain or neck stiffnes. No sore throat or dysphagia. Eyes: No blurred vision. Respiratory: As above. Cardiovascular: As above. Gastrointestinal: Negative. Genitourinary: Negative  Endocrine: Negative. Musculoskeletal: Negative. Skin: Negative. Allergic/Immunologic: Negative. Neurologic: Negative. Hematological: Negative. Psychiatric: Negative. All other systems are are noted to be otherwise negative. Physical Exam:   /75   Pulse 57   Temp 98.2 °F (36.8 °C) (Oral)   Resp 14   Ht 4' 10\" (1.473 m)   Wt 129 lb (58.5 kg)   SpO2 98%   BMI 26.96 kg/m²     Intake/Output Summary (Last 24 hours) at 5/27/2021 1621  Last data filed at 5/27/2021 4867  Gross per 24 hour   Intake 420 ml   Output --   Net 420 ml       GENERAL:  Alert, appropriate, oriented, in NAD. HEENT:  Head is atraumatic and normocephalic. No Pallor. No icterus. NECK: Supple without any thyromegaly. LUNGS: Generally clear to auscultation  CARDIAC: S1, S2, RRR. ABD:  Soft non-tender . EXT: No edema. MS: No obvious deformities. SKIN: No obvious skin rashes.   NEURO: No focal neurologic deficits      Labs/ Ancillary data:     CBC:   Recent Labs     05/25/21  1243 05/26/21  0354 05/27/21  0514   WBC 5.8 5.5 5.7   HGB 13.5 11.3* 11.2*    153 178     BMP:    Recent Labs     05/25/21  1243 05/26/21  0354    140   K 4.2 4.0    110*   CO2 23 22   BUN 10 10   CREATININE 0.81 0.91*   GLUCOSE 90 84     Hepatic:   Recent Labs     05/25/21  1243   AST 23   ALT 14   BILITOT 0.50   ALKPHOS 66     Troponin:   Recent Labs     05/25/21  1243   TROPONINT NOT REPORTED       Recent Labs     05/26/21  0354   INR 1.1       Imaging:    CXR: Reviewed. Echo: Reviewed. EKG: Reviewed. Impression :     Abnormal EKG. Shortness of breath. Palpitations. Anxiety     Plan :     Discussed case with Dr. Matt Hopkins. Will get a CT scan of chest to rule out PE. Telemetry monitoring for any arrythmias. Will follow. Thank you very much for allowing us to participate in the care of this patient. Please call us with any questions.     Electronically signed by Vanesa Bhagat MD on 5/27/2021 at 4:21 PM

## 2021-05-28 VITALS
RESPIRATION RATE: 18 BRPM | HEART RATE: 58 BPM | TEMPERATURE: 97.9 F | WEIGHT: 129 LBS | BODY MASS INDEX: 27.08 KG/M2 | DIASTOLIC BLOOD PRESSURE: 67 MMHG | SYSTOLIC BLOOD PRESSURE: 111 MMHG | HEIGHT: 58 IN | OXYGEN SATURATION: 97 %

## 2021-05-28 LAB
ABSOLUTE EOS #: 0.1 K/UL (ref 0–0.44)
ABSOLUTE IMMATURE GRANULOCYTE: 0.02 K/UL (ref 0–0.3)
ABSOLUTE LYMPH #: 2.1 K/UL (ref 1.1–3.7)
ABSOLUTE MONO #: 0.33 K/UL (ref 0.1–1.2)
ANION GAP SERPL CALCULATED.3IONS-SCNC: 11 MMOL/L (ref 9–17)
BASOPHILS # BLD: 1 % (ref 0–2)
BASOPHILS ABSOLUTE: 0.05 K/UL (ref 0–0.2)
BUN BLDV-MCNC: 7 MG/DL (ref 8–23)
BUN/CREAT BLD: 9 (ref 9–20)
CALCIUM SERPL-MCNC: 8.9 MG/DL (ref 8.6–10.4)
CHLORIDE BLD-SCNC: 106 MMOL/L (ref 98–107)
CO2: 20 MMOL/L (ref 20–31)
CREAT SERPL-MCNC: 0.78 MG/DL (ref 0.5–0.9)
DIFFERENTIAL TYPE: ABNORMAL
EOSINOPHILS RELATIVE PERCENT: 2 % (ref 1–4)
GFR AFRICAN AMERICAN: >60 ML/MIN
GFR NON-AFRICAN AMERICAN: >60 ML/MIN
GFR SERPL CREATININE-BSD FRML MDRD: ABNORMAL ML/MIN/{1.73_M2}
GFR SERPL CREATININE-BSD FRML MDRD: ABNORMAL ML/MIN/{1.73_M2}
GLUCOSE BLD-MCNC: 83 MG/DL (ref 70–99)
HCT VFR BLD CALC: 36.9 % (ref 36.3–47.1)
HEMOGLOBIN: 11.6 G/DL (ref 11.9–15.1)
IMMATURE GRANULOCYTES: 0 %
LYMPHOCYTES # BLD: 38 % (ref 24–43)
MAGNESIUM: 2.1 MG/DL (ref 1.6–2.6)
MCH RBC QN AUTO: 27.7 PG (ref 25.2–33.5)
MCHC RBC AUTO-ENTMCNC: 31.4 G/DL (ref 28.4–34.8)
MCV RBC AUTO: 88.1 FL (ref 82.6–102.9)
MONOCYTES # BLD: 6 % (ref 3–12)
NRBC AUTOMATED: 0 PER 100 WBC
PDW BLD-RTO: 12.7 % (ref 11.8–14.4)
PLATELET # BLD: 141 K/UL (ref 138–453)
PLATELET ESTIMATE: ABNORMAL
PMV BLD AUTO: 11.2 FL (ref 8.1–13.5)
POTASSIUM SERPL-SCNC: 4.2 MMOL/L (ref 3.7–5.3)
RBC # BLD: 4.19 M/UL (ref 3.95–5.11)
RBC # BLD: ABNORMAL 10*6/UL
SEG NEUTROPHILS: 53 % (ref 36–65)
SEGMENTED NEUTROPHILS ABSOLUTE COUNT: 2.94 K/UL (ref 1.5–8.1)
SODIUM BLD-SCNC: 137 MMOL/L (ref 135–144)
WBC # BLD: 5.5 K/UL (ref 3.5–11.3)
WBC # BLD: ABNORMAL 10*3/UL

## 2021-05-28 PROCEDURE — 97116 GAIT TRAINING THERAPY: CPT

## 2021-05-28 PROCEDURE — 97530 THERAPEUTIC ACTIVITIES: CPT

## 2021-05-28 PROCEDURE — 2580000003 HC RX 258: Performed by: FAMILY MEDICINE

## 2021-05-28 PROCEDURE — 6370000000 HC RX 637 (ALT 250 FOR IP): Performed by: FAMILY MEDICINE

## 2021-05-28 PROCEDURE — 80048 BASIC METABOLIC PNL TOTAL CA: CPT

## 2021-05-28 PROCEDURE — 83735 ASSAY OF MAGNESIUM: CPT

## 2021-05-28 PROCEDURE — 6360000002 HC RX W HCPCS: Performed by: FAMILY MEDICINE

## 2021-05-28 PROCEDURE — 36415 COLL VENOUS BLD VENIPUNCTURE: CPT

## 2021-05-28 PROCEDURE — 85025 COMPLETE CBC W/AUTO DIFF WBC: CPT

## 2021-05-28 RX ORDER — CEPHALEXIN 500 MG/1
500 CAPSULE ORAL 2 TIMES DAILY
Qty: 20 CAPSULE | Refills: 0 | Status: SHIPPED | OUTPATIENT
Start: 2021-05-28 | End: 2021-06-07

## 2021-05-28 RX ADMIN — PANTOPRAZOLE SODIUM 40 MG: 40 TABLET, DELAYED RELEASE ORAL at 08:40

## 2021-05-28 RX ADMIN — CEFTRIAXONE SODIUM 1000 MG: 1 INJECTION, POWDER, FOR SOLUTION INTRAMUSCULAR; INTRAVENOUS at 13:36

## 2021-05-28 RX ADMIN — SODIUM CHLORIDE, PRESERVATIVE FREE 10 ML: 5 INJECTION INTRAVENOUS at 08:41

## 2021-05-28 RX ADMIN — METOPROLOL TARTRATE 25 MG: 25 TABLET, FILM COATED ORAL at 08:40

## 2021-05-28 RX ADMIN — FLUOXETINE 20 MG: 20 CAPSULE ORAL at 08:40

## 2021-05-28 ASSESSMENT — PAIN SCALES - GENERAL: PAINLEVEL_OUTOF10: 0

## 2021-05-28 NOTE — PROGRESS NOTES
CLINICAL PHARMACY NOTE: MEDS TO BEDS    Total # of Prescriptions Filled: 1   The following medications were delivered to the patient:  · CEPHALEXIN 500MG    Additional Documentation:

## 2021-05-28 NOTE — PROGRESS NOTES
Physical Therapy  Facility[facility]Department[facility] PROGRESSIVE CARE  Daily Treatment Note  NAME: Ashley Jackson  : 1960  MRN: [id number]    Date of Service: 2021    Discharge Recommendations:           Assessment   Body structures, Functions, Activity limitations: Decreased functional mobility   Assessment: Skilled therapy needed to maximize independence with functional mobility to ensure safe discharge. Patient has met goals 1 and 2 at this time. Prognosis: Good  Decision Making: Low Complexity  Exam: AROM, strength, mobility, balance, endurance, AM-PAC, Tinetti  Clinical Presentation: evolving  PT Education: Goals;Transfer Training;General Safety;Gait Training;PT Role;Plan of Care  REQUIRES PT FOLLOW UP: Yes  Activity Tolerance  Activity Tolerance: Patient Tolerated treatment well     Patient Diagnosis(es): The primary encounter diagnosis was Dizziness. Diagnoses of Acute cystitis without hematuria and T wave inversion in EKG were also pertinent to this visit. has a past medical history of Anxiety, Benign fundic gland polyps of stomach, Cataract, Dystonia, Elevated liver enzymes, GERD (gastroesophageal reflux disease), MVP (mitral valve prolapse), PONV (postoperative nausea and vomiting), and Wears dentures. has a past surgical history that includes Hysterectomy; knee surgery; Wrist surgery; Finger trigger release; Upper gastrointestinal endoscopy (10/08/2012); Cholecystectomy; eye surgery; and Cystocopy (3-24-16).     Restrictions  Restrictions/Precautions  Restrictions/Precautions: Up as Tolerated, General Precautions  Position Activity Restriction  Other position/activity restrictions: RUE IV  Subjective   General  Chart Reviewed: Yes  Family / Caregiver Present: No  Subjective  Subjective: Patient very pleasand and agreeable to work with therapy, hoping to be d/c today  General Comment  Comments: CJ Sanchez states patient appropriate for therapy          Orientation  Orientation  Overall Orientation Status: Within Functional Limits  Cognition      Objective   Bed mobility  Bridging: Independent  Rolling to Left: Independent  Rolling to Right: Independent  Supine to Sit: Independent  Sit to Supine: Independent  Scooting: Independent  Comment: Patient with good positioning and sequencing. Appropriate hand positioning utilizing bed rails for progression techniques. Patient with good seated EOB positiong with no deficits noted. Transfers  Sit to Stand: Modified independent  Stand to sit: Modified independent  Bed to Chair: Supervision  Stand Pivot Transfers: Supervision  Lateral Transfers: Supervision  Comment: Patient with good standing balance proper postural alignment noted  Ambulation  Ambulation?: Yes  More Ambulation?: No  Ambulation 1  Surface: level tile  Device: No Device  Assistance: Supervision  Quality of Gait: Patient with good step through pattern noted and proper standing balance with good safety awareness. Gait Deviations: Slow Arlene  Distance: 250' x1  Comments: No loss of balance noted. No dizziness or lightheaded symptoms per patient. Neuromuscular Education  NDT Treatment: Gait ;Lower extremity; Sitting;Standing  Balance  Posture: Good  Sitting - Static: Good  Sitting - Dynamic: Good  Standing - Static: Good  Standing - Dynamic: Good  Tandem Stance R Leg: 10  Tandem Stance L Leg: 10  Single Leg Stance R Leg: 15  Single Leg Stance L Leg: 15  Comments: Patient completed single leg stance EO/EC activity with 15 seconds single leg stance ANISH with EO and 7 seconds ANISH with EC. Patient with Tandem leg stance activity 10 seconds with EO and 8 seconds with EC. Patient requires a seated rest break upon completion of balance activities. Patient utilizes stepping strategies for balance and stability techniques. Exercises  Comments: Educated patient on the importance of continued strengthening and mobility to maximize independence in functional tasks.   Patient with good understanding of Ther ex activities. Patient performs 30 second sit to stand activity completing 7 sit to stands. Patient does rely on UE support through arms of chair for support and stability during sit to stand timed activity. AM-PAC Score  AM-PAC Inpatient Mobility Raw Score : 22 (05/28/21 0912)  AM-PAC Inpatient T-Scale Score : 53.28 (05/28/21 0912)  Mobility Inpatient CMS 0-100% Score: 20.91 (05/28/21 0912)  Mobility Inpatient CMS G-Code Modifier : CJ (05/28/21 0912)          Goals  Short term goals  Time Frame for Short term goals: 6 visits  Short term goal 1: Independent sit<>stand  Short term goal 2: Patient to ambulate 200' without device independently  Short term goal 3: Patient to ascend/descend flight of steps with 1 HR SBA  Short term goal 4: Patient to perform 25 minutes ther activity without complaint of dizziness  Patient Goals   Patient goals : to go home today    Plan    Plan  Times per week: 1-2x/day for 5-6 days/week  Current Treatment Recommendations: Transfer Training, Gait Training, Stair training, Endurance Training, Balance Training, Patient/Caregiver Education & Training  Safety Devices  Type of devices:  All fall risk precautions in place, Left in bed, Call light within reach  Restraints  Initially in place: No     Therapy Time   Individual Concurrent Group Co-treatment   Time In 0845         Time Out 0911         Minutes [address], [place]

## 2021-05-28 NOTE — DISCHARGE SUMMARY
Hospital Discharge Summary     Patient Identification  Kyaw Wylie is a 56 y.o. female. :  1960    Facility: [facility]     Admit Status:Inpatient [101]     Admit Date:  2021  Discharge date and time: No discharge date for patient encounter. Attending Provider: Laci Kirkpatrick DO         Admission Diagnoses: Dehydration [E86.0]    Discharge Diagnoses: <principal problem not specified>  Active Hospital Problems    Diagnosis Date Noted    Dehydration [E86.0] 2021    UTI (urinary tract infection) [N39.0] 2015    GERD (gastroesophageal reflux disease) [K21.9] 2015       Admission  Status:fair    Discharge Status: good    Indication for Admission: See H&P    Hospital Course: Per Progress note section. Reason For Admit: Dizziness    Treatments During Hospital Stay: This is a 58-year-old  female who was admitted because of dizziness. She was found to have a UTI. She underwent for syncopal work-up which failed to show any cause for her dizziness. She was seen by cardiology because of diffuse ST-T wave abnormalities throughout her EKG. Cardiology had initially thought that maybe it was a PE even though her D-dimer had been negative. CTA of the chest was negative for any type of PE. Patient is EEG was normal.  Patient was to follow-up with cardiology as an outpatient for 48-hour Holter monitor. Plans aAfter Distcharge: 1. Patient to follow-up with our office in 14 days. 2.  Follow-up with cardiology for Holter monitor placement.     Consults: cardiology    Significant Diagnostic Studies: See results section    Lab Results   Component Value Date/Time    WBC 5.5 2021 05:44 AM    HGB 11.6 (L) 2021 05:44 AM    HCT 36.9 2021 05:44 AM     2021 05:44 AM     2012 10:09 AM    NEUTROABS 2.94 2021 05:44 AM     Lab Results   Component Value Date/Time     2021 05:44 AM    K 4.2 ---------------------------------------------------------------------------- ----------------------------------------------------------------------------  Electronically signed by [facility] physician) on  2021 09:41 AM ---------------------------------------------------------------------------- FINDINGS Left Atrium Left atrium is normal in size. Left Ventricle Left ventricle is normal in size Global left ventricular systolic function is normal Estimated ejection fraction is 65 % . Right Atrium Right atrium is normal in size. Right Ventricle Normal right ventricular size and function. Mitral Valve Normal mitral valve structure and function. Mild mitral regurgitation. Aortic Valve Normal aortic valve structure and function without stenosis or regurgitation. Tricuspid Valve Normal tricuspid valve structure and function. Trivial tricuspid regurgitation. Pulmonic Valve The pulmonic valve is normal in structure. No pulmonic insufficiency. Pericardial Effusion No pericardial effusion seen. Miscellaneous Normal aortic root dimension.  M-mode / 2D Measurements & Calculations:   LVIDd:4.2 cm(3.7 - 5.6 cm)       Diastolic KMLEJ.1 ml  XLJPQ:1.0 cm(2.2 - 4.0 cm)       Systolic HMQNMZ:53.6 ml  YBUB:8.7 cm(0.6 - 1.1 cm)        Aortic Root:2.7 cm(2.0 - 3.7 cm)  LVPWd:1 cm(0.6 - 1.1 cm)         LA Dimension: 3.6 cm(1.9 - 4.0 cm)  Fractional Shortenin.95 %    LA volume/Index: 36.5 ml /24m^2  Calculated LVEF (%): 67.07 %   Mitral:                                    Aortic   Valve Area (P1/2-Time): 3.61 cm^2          Peak Velocity: 1.11 m/s  Peak E-Wave: 0.85 m/s                      Peak Gradient: 4.93 mmHg  Peak A-Wave: 0.71 m/s  E/A Ratio: 1.2  Peak Gradient: 2.92 mmHg  Deceleration Time: 187 msec  P1/2t: 61 msec  Septal Wall E' velocity:0.07 m/s Lateral Wall E' velocity:0.07 m/s    CT HEAD WO CONTRAST    Result Date: 2021  EXAMINATION: CT OF THE HEAD WITHOUT CONTRAST  2021 12:32 pm TECHNIQUE: CT of the head was performed without the administration of intravenous contrast. Dose modulation, iterative reconstruction, and/or weight based adjustment of the mA/kV was utilized to reduce the radiation dose to as low as reasonably achievable. COMPARISON: 02/21/2019 HISTORY: ORDERING SYSTEM PROVIDED HISTORY: dizzy TECHNOLOGIST PROVIDED HISTORY: dizzy Decision Support Exception - unselect if not a suspected or confirmed emergency medical condition->Emergency Medical Condition (MA) Reason for Exam: Dizziness, fatigue Acuity: Acute Type of Exam: Initial Additional signs and symptoms: Pt has involuntary tremors FINDINGS: BRAIN/VENTRICLES: There is no acute intracranial hemorrhage, mass effect or midline shift. No abnormal extra-axial fluid collection. The gray-white differentiation is maintained without evidence of an acute infarct. There is no evidence of hydrocephalus. ORBITS: The visualized portion of the orbits demonstrate no acute abnormality. SINUSES: The visualized paranasal sinuses and mastoid air cells demonstrate no acute abnormality. SOFT TISSUES/SKULL:  No acute abnormality of the visualized skull or soft tissues. No acute intracranial abnormality. XR CHEST 1 VIEW    Result Date: 5/25/2021  EXAMINATION: ONE XRAY VIEW OF THE CHEST 5/25/2021 12:12 pm COMPARISON: 08/21/2012 HISTORY: ORDERING SYSTEM PROVIDED HISTORY: fatigue, weakness TECHNOLOGIST PROVIDED HISTORY: fatigue, weakness Reason for Exam: port ap upright fatigue weakness Acuity: Unknown Type of Exam: Unknown FINDINGS: No focal airspace consolidation, pleural effusion or pneumothorax is present. The heart is normal in size. The pulmonary vascularity is within normal limits. Osseous structures are unremarkable.      No acute cardiopulmonary disease     CT CHEST PULMONARY EMBOLISM W CONTRAST    Result Date: 5/27/2021  EXAMINATION: CTA OF THE CHEST 5/27/2021 12:23 pm TECHNIQUE: CTA of the chest was performed after the administration of intravenous contrast.  Multiplanar reformatted images are provided for review. MIP images are provided for review. Dose modulation, iterative reconstruction, and/or weight based adjustment of the mA/kV was utilized to reduce the radiation dose to as low as reasonably achievable. COMPARISON: CT scan dated June 28, 2019 HISTORY: ORDERING SYSTEM PROVIDED HISTORY: sob with ambulation TECHNOLOGIST PROVIDED HISTORY: sob with ambulation Reason for Exam: Pt states abnormal EKG, no breathing complaints. Acuity: Acute Type of Exam: Initial FINDINGS: Pulmonary Arteries: Pulmonary arteries are adequately opacified for evaluation. No evidence of intraluminal filling defect to suggest pulmonary embolism. Main pulmonary artery is normal in caliber. Mediastinum: No evidence of mediastinal lymphadenopathy. Cardiomegaly is present. Small pericardial effusion is noted. The heart and pericardium demonstrate no acute abnormality. There is no acute abnormality of the thoracic aorta. Lungs/pleura: Fibrotic changes again noted within the lung bases. No focal area of consolidation, or pneumothorax is present. Upper Abdomen: Images through the upper abdomen demonstrate focal eventration of the posteromedial aspect of the left hemidiaphragm. The gallbladder is surgically absent. A large amount of food debris is present within the stomach. Soft Tissues/Bones: No acute bone or soft tissue abnormality. 1. No evidence of pulmonary embolism or acute pulmonary disease 2. Chronic fibrotic changes again noted within the lung bases.      VL DUP CAROTID BILATERAL    Result Date: 5/26/2021    [facility]  Vascular Carotid Procedure   Patient Name Jose Eduardo Maldonado     Date of Study           05/26/2021               Justina Terrance   Date of      1960  Gender                  Female  Birth   Age          56 year(s)  Race                       Room Number  1008        Height: 57.87 inch, 147 cm   Corporate ID [id number]    Weight:                 129 pounds, 58.5 kg  #   Patient Acct [de-identified]   BSA:        1.51 m^2    BMI:        27.08 kg/m^2  #   MR #         [id number]     Sonographer             Benji Azevedo   Accession #  [id number]  Interpreting Physician  Nidhi Lino   Referring                Referring Physician     Alfred Murray,  Nurse  Practitioner  Procedure Type of Study:   Cerebral: Carotid, Carotid Scan Bilateral.  Indications for Study:Dizziness. Patient Status: In Patient. Technical Quality:Adequate visualization. Conclusions   Summary   Minimal 1-15 % stenosis of the bilateral internal carotid artery. Patent vertebral arteries bilaterally with antegrade flow. Signature   ----------------------------------------------------------------  Electronically signed by Edson Childress on  05/26/2021 09:21 AM  ----------------------------------------------------------------   ----------------------------------------------------------------  Electronically signed by Natalie SantillanInterpreting physician)  on 05/26/2021 05:47 PM  ----------------------------------------------------------------  Findings:   Right Impression:                    Left Impression:  Common carotid artery appears        Common carotid appears normal.  normal.                              Carotid scan shows minimal  Carotid scan shows minimal           heterogeneous plaque formation at the  heterogeneous plaque formation at    bifurcation, origin of the left  the bifurcation, origin of the right internal carotid artery. internal carotid artery. External carotid artery appears  External carotid artery appears      normal.  normal.                              Vertebral artery flow is antegrade . Vertebral artery flow is antegrade .   Velocities are measured in cm/s ; Diameters are measured in cm Carotid Right Measurements +------------+--------+--------+--------+-------+------------+-------------+ ! Location    ! PSV     ! EDV     ! Angle   ! RI     !%Stenosis   ! Tortuosity   ! +------------+--------+--------+--------+-------+------------+-------------+ ! Prox CCA    !72.49   !24.09   !        !0.67   !            !             ! +------------+--------+--------+--------+-------+------------+-------------+ ! Dist CCA    !62.59   !22.99   !        !0.63   !            !             ! +------------+--------+--------+--------+-------+------------+-------------+ ! Bulb        !55.99   !16.39   !        !0.71   !            !             ! +------------+--------+--------+--------+-------+------------+-------------+ ! Prox ICA    !90.45   !30.58   !        !0.66   !            !             ! +------------+--------+--------+--------+-------+------------+-------------+ ! Dist ICA    !93.69   !35.44   !        !0.62   !            !             ! +------------+--------+--------+--------+-------+------------+-------------+ ! Prox ECA    !72.49   !11.99   !        !0.83   !            !             ! +------------+--------+--------+--------+-------+------------+-------------+ ! Vertebral   !53.82   !21.9    !        !0.59   !            !             ! +------------+--------+--------+--------+-------+------------+-------------+   - There is antegrade vertebral flow noted on the right side. - Additional Measurements:ICAPSV/CCAPSV 1.29. ICAEDV/CCAEDV 1.47. Carotid Left Measurements +-----------+--------+-------+-------+------+-----------+------------------+ ! Location   ! PSV     ! EDV    ! Angle  ! RI    !%Stenosis  ! Tortuosity        ! +-----------+--------+-------+-------+------+-----------+------------------+ ! Prox CCA   !86.63   !21.88  !       !0.75  !           !                  ! +-----------+--------+-------+-------+------+-----------+------------------+ ! Dist CCA   !64.62   !23.18  !       !0.64  !           !                  ! +-----------+--------+-------+-------+------+-----------+------------------+ ! Bulb       !77.57   !29.65  !       !0.62  !           !                  ! +-----------+--------+-------+-------+------+-----------+------------------+ ! Prox ICA   !81.45   !30.95  !       !0.62  !           !                  ! +-----------+--------+-------+-------+------+-----------+------------------+ ! Dist ICA   !106.63  !43.53  !       !0.59  !           !                  ! +-----------+--------+-------+-------+------+-----------+------------------+ ! Prox ECA   !86.63   !19.29  !       !0.78  !           !                  ! +-----------+--------+-------+-------+------+-----------+------------------+ ! Vertebral  !59.29   !19.69  !       !0.67  !           !                  ! +-----------+--------+-------+-------+------+-----------+------------------+   - There is antegrade vertebral flow noted on the left side. - Additional Measurements:ICAPSV/CCAPSV 1.23. ICAEDV/CCAEDV 1.99. MRI BRAIN WO CONTRAST    Result Date: 5/26/2021  EXAMINATION: MRI OF THE BRAIN WITHOUT CONTRAST  5/26/2021 8:58 am TECHNIQUE: Multiplanar multisequence MRI of the brain was performed without the administration of intravenous contrast. COMPARISON: CT brain performed 05/25/2021. HISTORY: ORDERING SYSTEM PROVIDED HISTORY: Dizziness TECHNOLOGIST PROVIDED HISTORY: Dizziness Decision Support Exception - unselect if not a suspected or confirmed emergency medical condition->Emergency Medical Condition (MA) Reason for Exam: Dizziness X 2 weeks, left side weakness. Acuity: Acute Type of Exam: Subsequent/Follow-up FINDINGS: INTRACRANIAL STRUCTURES/VENTRICLES: The sellar and suprasellar structures, optic chiasm, corpus callosum, pineal gland, tectum, and midline brainstem structures are unremarkable. The craniocervical junction is unremarkable. There is no acute hemorrhage, mass effect, or midline shift.   There is satisfactory overall gray-white matter differentiation. The ventricular structures are symmetric and unremarkable. The infratentorial structures including the cerebellopontine angles and internal auditory canals are unremarkable. There is no abnormal restricted diffusion. There is no abnormal blooming artifact on susceptibility weighted imaging. ORBITS: The visualized portion of the orbits demonstrate no acute abnormality. SINUSES: The visualized paranasal sinuses and mastoid air cells are well aerated. BONES/SOFT TISSUES: The bone marrow signal intensity appears normal. The soft tissues demonstrate no acute abnormality. Unremarkable MRI of the brain without acute abnormality.        Treatments: IV hydration and antibiotics: ceftriaxone    Discharge Exam:  Physical Exam  /67   Pulse 58   Temp 97.9 °F (36.6 °C) (Oral)   Resp 18   Ht 4' 10\" (1.473 m)   Wt 129 lb (58.5 kg)   SpO2 97%   BMI 26.96 kg/m²     General Appearance:    Alert, cooperative, no distress, appears stated age   Head:    Normocephalic, without obvious abnormality, atraumatic   Eyes:    PERRL, conjunctiva/corneas clear, EOM's intact, fundi     benign, both eyes   Ears:    Normal TM's and external ear canals, both ears   Nose:   Nares normal, septum midline, mucosa normal, no drainage    or sinus tenderness   Throat:   Lips, mucosa, and tongue normal; teeth and gums normal   Neck:   Supple, symmetrical, trachea midline, no adenopathy;     thyroid:  no enlargement/tenderness/nodules; no carotid    bruit or JVD   Back:     Symmetric, no curvature, ROM normal, no CVA tenderness   Lungs:     Clear to auscultation bilaterally, respirations unlabored   Chest Wall:    No tenderness or deformity    Heart:    Regular rate and rhythm, S1 and S2 normal, no murmur, rub   or gallop       Abdomen:     Soft, non-tender, bowel sounds active all four quadrants,     no masses, no organomegaly           Extremities:   Extremities normal, atraumatic, no cyanosis or edema   Pulses:   2+ and symmetric all extremities   Skin:   Skin color, texture, turgor normal, no rashes or lesions   Lymph nodes:   Cervical, supraclavicular, and axillary nodes normal   Neurologic:   CNII-XII intact, normal strength, sensation and reflexes     throughout       Disposition: home    Patient Instructions:    Shobha Lopez   Home Medication Instructions UOL:[id number]    Printed on:05/28/21 [id number]   Medication Information                      cephALEXin (KEFLEX) 500 MG capsule  Take 1 capsule by mouth 2 times daily for 10 days             FLUoxetine (PROZAC) 20 MG capsule  20 mg daily              metoprolol (LOPRESSOR) 25 MG tablet  Take 25 mg by mouth daily              pantoprazole (PROTONIX) 40 MG tablet  Take 40 mg by mouth daily                   Medication List      START taking these medications    cephALEXin 500 MG capsule  Commonly known as: Keflex  Take 1 capsule by mouth 2 times daily for 10 days        CONTINUE taking these medications    FLUoxetine 20 MG capsule  Commonly known as: PROZAC     metoprolol tartrate 25 MG tablet  Commonly known as: LOPRESSOR     pantoprazole 40 MG tablet  Commonly known as: PROTONIX        STOP taking these medications    ALEVE PO     BOTOX IJ     traMADol 50 MG tablet  Commonly known as: ULTRAM           Where to Get Your Medications      You can get these medications from any pharmacy    Bring a paper prescription for each of these medications  · cephALEXin 500 MG capsule           Activity: activity as tolerated    Diet: regular diet    Wound Care: none needed      Follow-up with Erin Matos DO in 2 weeks. Erin Matos DO  No discharge date for patient encounter. This note was created with the assistance of a speech-recognition program. Although the intention is to generate a document that actually reflects the content of the visit, no guarantees can be provided that every mistake has been identified and corrected by editing.

## 2021-06-24 PROBLEM — E86.0 DEHYDRATION: Status: RESOLVED | Noted: 2021-05-25 | Resolved: 2021-06-24

## 2022-03-28 ENCOUNTER — HOSPITAL ENCOUNTER (OUTPATIENT)
Dept: MAMMOGRAPHY | Age: 62
Discharge: HOME | End: 2022-03-30
Payer: MEDICARE

## 2022-03-28 ENCOUNTER — HOSPITAL ENCOUNTER (OUTPATIENT)
Dept: ULTRASOUND IMAGING | Age: 62
Discharge: HOME | End: 2022-03-30
Payer: MEDICARE

## 2022-03-28 DIAGNOSIS — R92.8 ABNORMAL MAMMOGRAM: ICD-10-CM

## 2022-03-28 DIAGNOSIS — R92.2 BREAST DENSITY: ICD-10-CM

## 2022-03-28 PROCEDURE — G0279 TOMOSYNTHESIS, MAMMO: HCPCS

## 2022-03-28 PROCEDURE — 76642 ULTRASOUND BREAST LIMITED: CPT

## 2022-05-29 ENCOUNTER — HOSPITAL ENCOUNTER (EMERGENCY)
Age: 62
Discharge: HOME | End: 2022-05-29
Attending: EMERGENCY MEDICINE
Payer: MEDICARE

## 2022-05-29 ENCOUNTER — APPOINTMENT (OUTPATIENT)
Dept: GENERAL RADIOLOGY | Age: 62
End: 2022-05-29
Payer: MEDICARE

## 2022-05-29 VITALS
DIASTOLIC BLOOD PRESSURE: 71 MMHG | SYSTOLIC BLOOD PRESSURE: 125 MMHG | OXYGEN SATURATION: 95 % | TEMPERATURE: 98.6 F | HEIGHT: 58 IN | HEART RATE: 66 BPM | WEIGHT: 130 LBS | BODY MASS INDEX: 27.29 KG/M2 | RESPIRATION RATE: 22 BRPM

## 2022-05-29 DIAGNOSIS — U07.1 COVID-19: Primary | ICD-10-CM

## 2022-05-29 PROCEDURE — 71045 X-RAY EXAM CHEST 1 VIEW: CPT

## 2022-05-29 PROCEDURE — 99283 EMERGENCY DEPT VISIT LOW MDM: CPT

## 2022-05-29 RX ORDER — BENZONATATE 100 MG/1
200 CAPSULE ORAL ONCE
Status: DISCONTINUED | OUTPATIENT
Start: 2022-05-29 | End: 2022-05-29 | Stop reason: HOSPADM

## 2022-05-29 RX ORDER — IBUPROFEN 800 MG/1
800 TABLET ORAL ONCE
Status: DISCONTINUED | OUTPATIENT
Start: 2022-05-29 | End: 2022-05-29 | Stop reason: HOSPADM

## 2022-05-29 RX ORDER — ACETAMINOPHEN 500 MG
1000 TABLET ORAL ONCE
Status: DISCONTINUED | OUTPATIENT
Start: 2022-05-29 | End: 2022-05-29 | Stop reason: HOSPADM

## 2022-05-29 RX ORDER — GUAIFENESIN AND CODEINE PHOSPHATE 100; 10 MG/5ML; MG/5ML
5 SOLUTION ORAL 3 TIMES DAILY PRN
Qty: 45 ML | Refills: 0 | Status: SHIPPED | OUTPATIENT
Start: 2022-05-29 | End: 2022-06-01

## 2022-05-29 ASSESSMENT — ENCOUNTER SYMPTOMS
SHORTNESS OF BREATH: 0
SORE THROAT: 0
SINUS PAIN: 1
COUGH: 1
NAUSEA: 0
SINUS PRESSURE: 1
DIARRHEA: 0
VOMITING: 0

## 2022-05-29 ASSESSMENT — PAIN - FUNCTIONAL ASSESSMENT: PAIN_FUNCTIONAL_ASSESSMENT: NONE - DENIES PAIN

## 2022-05-29 NOTE — ED PROVIDER NOTES
[facility]  Emergency Department Encounter     Pt Name: Te Kaiser  MRN: [id number]  [place] 1960  Date of evaluation: 5/29/22  PCP:  Serge Tong DO    CHIEF COMPLAINT       Chief Complaint   Patient presents with    Positive For Covid-19     headache, body-aches, fatigue    Cough       HISTORY OF PRESENT ILLNESS  (Location/Symptom, Timing/Onset, Context/Setting, Quality, Duration, Modifying Factors, Severity.)    Te Kaiser is a 59 y.o. female who is known COVID-positive from 2 positive home test about 2 days ago presents emergency department with headache, congestion, sinus pain and pressure, diffuse body aches, fatigue, nonproductive cough. Denies any chest pain or shortness of breath. Has not had any fevers at home that she is aware of. PAST MEDICAL / SURGICAL / SOCIAL / FAMILY HISTORY    has a past medical history of Anxiety, Benign fundic gland polyps of stomach, Cataract, Dystonia, Elevated liver enzymes, GERD (gastroesophageal reflux disease), MVP (mitral valve prolapse), PONV (postoperative nausea and vomiting), and Wears dentures. has a past surgical history that includes Hysterectomy; knee surgery; Wrist surgery; Finger trigger release; Upper gastrointestinal endoscopy (10/08/2012); Cholecystectomy; eye surgery; and Cystocopy (3-24-16).     Social History     Socioeconomic History    Marital status:      Spouse name: Not on file    Number of children: Not on file    Years of education: Not on file    Highest education level: Not on file   Occupational History    Not on file   Tobacco Use    Smoking status: Former Smoker    Smokeless tobacco: Never Used   Substance and Sexual Activity    Alcohol use: No    Drug use: No    Sexual activity: Not on file   Other Topics Concern    Not on file   Social History Narrative    Not on file     Social Determinants of Health     Financial Resource Strain:    [facility] Difficulty of Paying Living Expenses: Not on file   Food Insecurity:     Worried About Running Out of Food in the Last Year: Not on file    Ran Out of Food in the Last Year: Not on file   Transportation Needs:     Lack of Transportation (Medical): Not on file    Lack of Transportation (Non-Medical): Not on file   Physical Activity:     Days of Exercise per Week: Not on file    Minutes of Exercise per Session: Not on file   Stress:     Feeling of Stress : Not on file   Social Connections:     Frequency of Communication with Friends and Family: Not on file    Frequency of Social Gatherings with Friends and Family: Not on file    Attends Mu-ism Services: Not on file    Active Member of [address] [place] [address] [employer] or Organizations: Not on file    Attends Club or Organization Meetings: Not on file    Marital Status: Not on file   Intimate Partner Violence:     Fear of Current or Ex-Partner: Not on file    Emotionally Abused: Not on file    Physically Abused: Not on file    Sexually Abused: Not on file   Housing Stability:     Unable to Pay for Housing in the Last Year: Not on file    Number of [place] in the Last Year: Not on file    Unstable Housing in the Last Year: Not on file       Family History   Problem Relation Age of Onset    Cancer Mother     Cancer Father     Cancer Maternal Grandmother     Cancer Paternal Grandmother        Allergies:    Gentamicin, Tetanus toxoids, and Zithromax [azithromycin dihydrate]    Home Medications:  Prior to Admission medications    Medication Sig Start Date End Date Taking? Authorizing Provider   guaiFENesin-codeine (TUSSI-ORGANIDIN NR) 100-10 MG/5ML syrup Take 5 mLs by mouth 3 times daily as needed for Cough for up to 3 days.  5/29/22 6/1/22 Yes Adelita Vásquez DO   pantoprazole (PROTONIX) 40 MG tablet Take 40 mg by mouth daily  2/9/15   Historical Provider, MD   FLUoxetine (PROZAC) 20 MG capsule 20 mg daily  8/28/12   Historical Provider, MD   metoprolol (LOPRESSOR) 25 MG tablet Take 25 mg by mouth daily  9/8/12   Historical Provider, MD       REVIEW OF SYSTEMS    (2-9 systems for level 4, 10 or more for level 5)    Review of Systems   Constitutional: Positive for fatigue. Negative for chills, diaphoresis and fever. HENT: Positive for congestion, sinus pressure and sinus pain. Negative for sore throat. Eyes: Negative for visual disturbance. Respiratory: Positive for cough. Negative for shortness of breath. Cardiovascular: Negative for chest pain. Gastrointestinal: Negative for diarrhea, nausea and vomiting. Musculoskeletal: Positive for myalgias. Skin: Negative for rash. Allergic/Immunologic: Negative for immunocompromised state. Neurological: Positive for headaches. PHYSICAL EXAM   (up to 7 for level 4, 8 or more for level 5)    VITALS:   Vitals:    05/29/22 1141   BP: 125/71   Pulse: 66   Resp: 22   Temp: 98.6 °F (37 °C)   TempSrc: Oral   SpO2: 95%   Weight: 130 lb (59 kg)   Height: 4' 10\" (1.473 m)       Physical Exam  Vitals and nursing note reviewed. Constitutional:       General: She is not in acute distress. Appearance: She is well-developed. She is not diaphoretic. HENT:      Head: Normocephalic and atraumatic. Mouth/Throat:      Mouth: Mucous membranes are moist.   Eyes:      Extraocular Movements: Extraocular movements intact. Conjunctiva/sclera: Conjunctivae normal.      Pupils: Pupils are equal, round, and reactive to light. Cardiovascular:      Rate and Rhythm: Normal rate and regular rhythm. Heart sounds: Normal heart sounds. Pulmonary:      Effort: Pulmonary effort is normal. No respiratory distress. Breath sounds: Normal breath sounds. No wheezing, rhonchi or rales. Abdominal:      General: There is no distension. Palpations: Abdomen is soft. Tenderness: There is no abdominal tenderness. Musculoskeletal:         General: Normal range of motion. Cervical back: Normal range of motion. tracings, or specimens: yes    Patient Progress  Patient progress: stable      PROCEDURES:  Procedures     CONSULTS:  None    CRITICAL CARE:  NONE    FINAL IMPRESSION     1. COVID-19        DISPOSITION / PLAN   DISPOSITION        Evaluation and treatment course in the ED, and plan of care upon discharge was discussed in length with the patient. Patient had no further questions prior to being discharged and was instructed to return to the ED for new or worsening symptoms. Any changes to existing medications or new prescriptions were reviewed with patient and they expressed understanding of how to correctly take their medications and the possible side effects. PATIENT REFERRED TO:  Hayden Erickson DO  [address] [facility] [address]  [phone]          [facility] ED  [address] [facility]  [phone]    As needed, If symptoms worsen      DISCHARGE MEDICATIONS:  New Prescriptions    GUAIFENESIN-CODEINE (TUSSI-ORGANIDIN NR) 100-10 MG/5ML SYRUP    Take 5 mLs by mouth 3 times daily as needed for Cough for up to 3 days. Adelita Gastelum DO  Emergency Medicine Physician    (Please note that portions of this note were completed with a voice recognition program.  Efforts were made to edit the dictations but occasionally words are mis-transcribed.)        Suellen Metzger 1721, DO  05/29/22 1324

## 2022-11-03 ENCOUNTER — APPOINTMENT (OUTPATIENT)
Dept: GENERAL RADIOLOGY | Age: 62
End: 2022-11-03
Payer: MEDICARE

## 2022-11-03 ENCOUNTER — HOSPITAL ENCOUNTER (EMERGENCY)
Age: 62
Discharge: HOME | End: 2022-11-03
Attending: EMERGENCY MEDICINE
Payer: MEDICARE

## 2022-11-03 VITALS
SYSTOLIC BLOOD PRESSURE: 113 MMHG | BODY MASS INDEX: 26.75 KG/M2 | WEIGHT: 128 LBS | TEMPERATURE: 98 F | HEART RATE: 71 BPM | DIASTOLIC BLOOD PRESSURE: 98 MMHG | RESPIRATION RATE: 16 BRPM | OXYGEN SATURATION: 99 %

## 2022-11-03 DIAGNOSIS — M79.601 PAIN OF RIGHT UPPER EXTREMITY: Primary | ICD-10-CM

## 2022-11-03 PROCEDURE — 73130 X-RAY EXAM OF HAND: CPT

## 2022-11-03 PROCEDURE — 99284 EMERGENCY DEPT VISIT MOD MDM: CPT

## 2022-11-03 PROCEDURE — 73110 X-RAY EXAM OF WRIST: CPT

## 2022-11-03 PROCEDURE — 73080 X-RAY EXAM OF ELBOW: CPT

## 2022-11-03 RX ORDER — NAPROXEN 500 MG/1
500 TABLET ORAL 2 TIMES DAILY WITH MEALS
Qty: 30 TABLET | Refills: 0 | Status: SHIPPED | OUTPATIENT
Start: 2022-11-03 | End: 2022-11-18

## 2022-11-03 ASSESSMENT — PAIN DESCRIPTION - DESCRIPTORS: DESCRIPTORS: ACHING

## 2022-11-03 ASSESSMENT — PAIN DESCRIPTION - FREQUENCY: FREQUENCY: CONTINUOUS

## 2022-11-03 ASSESSMENT — PAIN SCALES - GENERAL: PAINLEVEL_OUTOF10: 3

## 2022-11-03 ASSESSMENT — PAIN - FUNCTIONAL ASSESSMENT: PAIN_FUNCTIONAL_ASSESSMENT: 0-10

## 2022-11-03 ASSESSMENT — PAIN DESCRIPTION - LOCATION: LOCATION: ARM

## 2022-11-03 NOTE — ED PROVIDER NOTES
eMERGENCY dEPARTMENT eNCOUnter   [address] [place] Name: Anika Black  MRN: [id number]  [place] 1960  Date of evaluation: 11/3/22     Anika Black is a 59 y.o. female with CC: Arm Injury (Right fell last Mon, denies LOC)      This visit was performed by both a physician and an APC. I performed all aspects of the MDM as documented.     Ashia Aguila DO  Attending Emergency Physician                  Anne Avalos DO  11/03/22 9184

## 2022-11-03 NOTE — ED NOTES
I told pt it was broken and then I told her I was mistaken. Ita Arrington NP in to speak with pt.       Tyron Yousif RN  11/03/22 8840

## 2022-12-11 ENCOUNTER — HOSPITAL ENCOUNTER (EMERGENCY)
Age: 62
Discharge: HOME | End: 2022-12-11
Payer: MEDICARE

## 2022-12-11 ENCOUNTER — APPOINTMENT (OUTPATIENT)
Dept: GENERAL RADIOLOGY | Age: 62
End: 2022-12-11
Payer: MEDICARE

## 2022-12-11 ENCOUNTER — HOSPITAL ENCOUNTER (EMERGENCY)
Age: 62
Discharge: HOME | End: 2022-12-11
Attending: EMERGENCY MEDICINE
Payer: MEDICARE

## 2022-12-11 VITALS
HEIGHT: 58 IN | TEMPERATURE: 98 F | HEART RATE: 89 BPM | DIASTOLIC BLOOD PRESSURE: 74 MMHG | BODY MASS INDEX: 27.71 KG/M2 | OXYGEN SATURATION: 99 % | WEIGHT: 132 LBS | RESPIRATION RATE: 16 BRPM | SYSTOLIC BLOOD PRESSURE: 106 MMHG

## 2022-12-11 VITALS
SYSTOLIC BLOOD PRESSURE: 113 MMHG | HEIGHT: 58 IN | OXYGEN SATURATION: 100 % | TEMPERATURE: 98.2 F | WEIGHT: 130 LBS | BODY MASS INDEX: 27.29 KG/M2 | DIASTOLIC BLOOD PRESSURE: 74 MMHG | HEART RATE: 78 BPM | RESPIRATION RATE: 14 BRPM

## 2022-12-11 DIAGNOSIS — R07.89 CHEST WALL PAIN: Primary | ICD-10-CM

## 2022-12-11 PROCEDURE — 93005 ELECTROCARDIOGRAM TRACING: CPT | Performed by: STUDENT IN AN ORGANIZED HEALTH CARE EDUCATION/TRAINING PROGRAM

## 2022-12-11 PROCEDURE — 99283 EMERGENCY DEPT VISIT LOW MDM: CPT

## 2022-12-11 PROCEDURE — 4500000002 HC ER NO CHARGE

## 2022-12-11 PROCEDURE — 71045 X-RAY EXAM CHEST 1 VIEW: CPT

## 2022-12-11 RX ORDER — METHOCARBAMOL 500 MG/1
500 TABLET, FILM COATED ORAL 3 TIMES DAILY PRN
Qty: 15 TABLET | Refills: 0 | Status: SHIPPED | OUTPATIENT
Start: 2022-12-11 | End: 2022-12-16

## 2022-12-11 RX ORDER — IBUPROFEN 600 MG/1
600 TABLET ORAL EVERY 8 HOURS PRN
Qty: 20 TABLET | Refills: 0 | Status: SHIPPED | OUTPATIENT
Start: 2022-12-11

## 2022-12-11 ASSESSMENT — PAIN - FUNCTIONAL ASSESSMENT
PAIN_FUNCTIONAL_ASSESSMENT: NONE - DENIES PAIN
PAIN_FUNCTIONAL_ASSESSMENT: NONE - DENIES PAIN

## 2022-12-11 NOTE — ED TRIAGE NOTES
Patient presented to the ED today with complaints of chest pain.  Patient states that she was reaching for an item on a shelf yesterday and felt something pop in her chest.

## 2022-12-11 NOTE — ED TRIAGE NOTES
Pt reports reching for a bag of catfood at [address] yesterday & \"felt a pop\" in sternum. No pain at present. Pain is only present with movement. Requests \"to be checked out. \"

## 2022-12-12 NOTE — ED PROVIDER NOTES
[address] [place] [address] ED  EMERGENCY DEPARTMENT ENCOUNTER      Pt Name: Henry Victoria  MRN: [id number]  [place] 1960  Date of evaluation: 12/11/2022  Provider: DELVIN Beth CNP    CHIEF COMPLAINT       Chief Complaint   Patient presents with    Other     Reached for cat food & felt \"pop \" in chest         HISTORY OFPRESENT ILLNESS  (Location/Symptom, Timing/Onset, Context/Setting, Quality, Duration, Modifying Factors, Severity.)   Henry Victoria is a 57 y.o. female who presents to the emergency department by private auto for evaluation of pain to the lower chest area that occurred today when she was trying to reach a bag of cat food on the second shelf and when she grabbed a bag and pulled it towards her she felt a popping sensation in her lower chest and developed pain. Patient states her chest pain only occurs when she moves. No chest pain at rest.  No cough or shortness of breath. Nursing Notes were reviewed. PASTMEDICAL HISTORY     Past Medical History:   Diagnosis Date    Anxiety     Benign fundic gland polyps of stomach     Cataract RIGHT    Dystonia     Elevated liver enzymes     GERD (gastroesophageal reflux disease)     MVP (mitral valve prolapse)     PONV (postoperative nausea and vomiting)     Wears dentures     UPPER         SURGICAL HISTORY       Past Surgical History:   Procedure Laterality Date    CHOLECYSTECTOMY      CYSTOSCOPY  3-24-16    EYE SURGERY      CATARACT REMOVED FROM LEFT. IOL PLACED., ANISH. LASIK EYE SURG.     FINGER TRIGGER RELEASE      thumb    HYSTERECTOMY (CERVIX STATUS UNKNOWN)      KNEE SURGERY      right     UPPER GASTROINTESTINAL ENDOSCOPY  10/08/2012    [facility]; GERD    WRIST SURGERY      left          CURRENT MEDICATIONS     Discharge Medication List as of 12/11/2022  8:31 PM        CONTINUE these medications which have NOT CHANGED    Details   metoprolol (LOPRESSOR) 25 MG tablet Take 25 mg by mouth daily              ALLERGIES     Gentamicin, Tetanus toxoids, and Zithromax [azithromycin dihydrate]    FAMILY HISTORY       Family History   Problem Relation Age of Onset    Cancer Mother     Cancer Father     Cancer Maternal Grandmother     Cancer Paternal Grandmother           SOCIAL HISTORY       Social History     Socioeconomic History    Marital status:      Spouse name: None    Number of children: None    Years of education: None    Highest education level: None   Tobacco Use    Smoking status: Former    Smokeless tobacco: Never   Substance and Sexual Activity    Alcohol use: No    Drug use: No         REVIEW OF SYSTEMS    (2-9 systems for level 4, 10 or more for level 5)     Review of Systems   Cardiovascular:  Positive for chest pain. All other systems reviewed and are negative. Except as noted above the remainder of the review of systems was reviewed and negative. PHYSICAL EXAM    (up to 7 for level 4, 8 or more for level 5)     ED Triage Vitals [12/11/22 1829]   BP Temp Temp Source Heart Rate Resp SpO2 Height Weight   113/74 98.2 °F (36.8 °C) Oral 78 14 100 % 4' 10\" (1.473 m) 130 lb (59 kg)       Physical Exam  Constitutional:       Appearance: Normal appearance. She is well-developed, well-groomed and normal weight. HENT:      Head: Normocephalic. Right Ear: External ear normal.      Left Ear: External ear normal.      Nose: Nose normal.   Eyes:      Conjunctiva/sclera: Conjunctivae normal.   Cardiovascular:      Rate and Rhythm: Normal rate and regular rhythm. Pulses: Normal pulses. Heart sounds: Normal heart sounds. Pulmonary:      Breath sounds: Normal breath sounds. Chest:      Chest wall: Tenderness present. No swelling, crepitus or edema. Comments: Patient has tenderness palpation to the xiphoid process. There is no crepitus, rash erythema ecchymosis or swelling to the chest.  Abdominal:      Palpations: Abdomen is soft. Tenderness: There is no abdominal tenderness.    Musculoskeletal:         General: Normal range of motion. Cervical back: Normal range of motion. Skin:     General: Skin is warm and dry. Findings: No bruising, erythema or rash. Neurological:      Mental Status: She is alert and oriented to person, place, and time. DIAGNOSTIC RESULTS     EKG:All EKG's are interpreted by the Emergency Department Physician who either signs or Co-signs this chart in the absence of a cardiologist.        RADIOLOGY:   Non-plain film images such as CT, Ultrasound and MRI are read by theradiologist. Plain radiographic images are visualized and preliminarily interpreted by the emergency physician with the below findings:    XR CHEST PORTABLE    Result Date: 12/11/2022  EXAMINATION: ONE XRAY VIEW OF THE CHEST 12/11/2022 6:53 pm COMPARISON: May 29, 2022. HISTORY: ORDERING SYSTEM PROVIDED HISTORY: cory TECHNOLOGIST PROVIDED HISTORY: cory Reason for Exam: chest pain Additional signs and symptoms: midline ap chest pain, pt was reaching for something and felt a pop in chest FINDINGS: Frontal portable view of the chest.  Normal lung volume. No focal airspace disease. Normal pulmonary vasculature. No pleural effusion or pneumothorax. Stable cardiomediastinal silhouette and great vessels. Stable regional skeleton. No acute cardiopulmonary process. Interpretation per the Radiologist below, if available at the time of this note:    XR CHEST PORTABLE   Final Result   No acute cardiopulmonary process. EDBEDSIDE ULTRASOUND:   Performed by Avelina Koch - none    LABS:  Labs Reviewed - No data to display    All other labs were within normal range or not returned as of this dictation. EMERGENCY DEPARTMENT COURSE andDIFFERENTIAL DIAGNOSIS/MDM:   Patient evaluated conjunction with ER physician. Chest x-ray no acute Process. Patient Has Tenderness at the Xiphoid Process. Also has pain with movement. Offered something for pain in the ED but declined.   Discussed test results and diagnosis with the patient. Prescriptions written for muscle relaxant Motrin for home. She is on tramadol at home for chronic pain. Return precautions provided. Vitals:    Vitals:    12/11/22 1829   BP: 113/74   Pulse: 78   Resp: 14   Temp: 98.2 °F (36.8 °C)   TempSrc: Oral   SpO2: 100%   Weight: 130 lb (59 kg)   Height: 4' 10\" (1.473 m)         CONSULTS:  None    RES:  Procedures    FINAL IMPRESSION      1.  Chest wall pain          DISPOSITION/PLAN   DISPOSITION Decision To Discharge 12/11/2022 08:28:31 PM      PATIENT REFERRED TO:   [address] [place] [address] ED  [address] [facility]  [phone]    If symptoms worsen    DISCHARGE MEDICATIONS:     Discharge Medication List as of 12/11/2022  8:31 PM        START taking these medications    Details   ibuprofen (IBU) 600 MG tablet Take 1 tablet by mouth every 8 hours as needed for Pain, Disp-20 tablet, R-0Print      methocarbamol (ROBAXIN) 500 MG tablet Take 1 tablet by mouth 3 times daily as needed (pain), Disp-15 tablet, R-0Print           Electronically signed by DELVIN Chakraborty 12/11/2022 at 9:08 PM            DELVIN Chakraborty CNP  12/11/22 5804

## 2022-12-13 LAB
EKG ATRIAL RATE: 70 BPM
EKG P AXIS: 12 DEGREES
EKG P-R INTERVAL: 152 MS
EKG Q-T INTERVAL: 416 MS
EKG QRS DURATION: 76 MS
EKG QTC CALCULATION (BAZETT): 449 MS
EKG R AXIS: -14 DEGREES
EKG T AXIS: 56 DEGREES
EKG VENTRICULAR RATE: 70 BPM

## 2022-12-13 PROCEDURE — 93010 ELECTROCARDIOGRAM REPORT: CPT | Performed by: INTERNAL MEDICINE

## 2022-12-20 NOTE — ED PROVIDER NOTES
eMERGENCY dEPARTMENT eNCOUnter   [address] [place] Name: Joe Pineda  MRN: [id number]  [place] 1960  Date of evaluation: 12/20/22     Joe Pineda is a 57 y.o. female with CC: Other (Reached for cat food & felt \"pop \" in chest)        This visit was performed by both a physician and an APC. I performed all aspects of the MDM as documented. The care is provided during an unprecedented national emergency due to the novel coronavirus, COVID 19.     Nabor Justice MD  Attending Emergency Physician           Kyle Nelson MD  12/20/22 7983